# Patient Record
Sex: FEMALE | Race: WHITE | Employment: OTHER | URBAN - METROPOLITAN AREA
[De-identification: names, ages, dates, MRNs, and addresses within clinical notes are randomized per-mention and may not be internally consistent; named-entity substitution may affect disease eponyms.]

---

## 2018-08-10 PROBLEM — Z98.61 S/P PTCA (PERCUTANEOUS TRANSLUMINAL CORONARY ANGIOPLASTY): Status: ACTIVE | Noted: 2018-08-10

## 2018-08-10 PROBLEM — R07.9 CHEST PAIN: Status: ACTIVE | Noted: 2018-08-10

## 2018-08-10 PROBLEM — E11.9 TYPE 2 DIABETES MELLITUS WITHOUT COMPLICATION, WITHOUT LONG-TERM CURRENT USE OF INSULIN (HCC): Status: ACTIVE | Noted: 2018-08-10

## 2018-08-10 PROBLEM — E78.2 MIXED HYPERLIPIDEMIA: Status: ACTIVE | Noted: 2018-08-10

## 2018-08-14 ENCOUNTER — HOSPITAL ENCOUNTER (OUTPATIENT)
Dept: CARDIAC CATH/INVASIVE PROCEDURES | Age: 73
Discharge: HOME | End: 2018-08-14
Attending: INTERNAL MEDICINE | Admitting: INTERNAL MEDICINE
Payer: MEDICARE

## 2018-08-14 VITALS
HEIGHT: 63 IN | BODY MASS INDEX: 23.92 KG/M2 | WEIGHT: 135 LBS | OXYGEN SATURATION: 94 % | RESPIRATION RATE: 18 BRPM | TEMPERATURE: 98.1 F | HEART RATE: 63 BPM | SYSTOLIC BLOOD PRESSURE: 137 MMHG | DIASTOLIC BLOOD PRESSURE: 63 MMHG

## 2018-08-14 LAB
ALBUMIN SERPL-MCNC: 3.9 G/DL (ref 3.2–4.6)
ALBUMIN/GLOB SERPL: 1.1 {RATIO} (ref 1.2–3.5)
ALP SERPL-CCNC: 77 U/L (ref 50–136)
ALT SERPL-CCNC: 22 U/L (ref 12–65)
ANION GAP SERPL CALC-SCNC: 9 MMOL/L (ref 7–16)
AST SERPL-CCNC: 24 U/L (ref 15–37)
BILIRUB SERPL-MCNC: 0.7 MG/DL (ref 0.2–1.1)
BUN SERPL-MCNC: 14 MG/DL (ref 8–23)
CALCIUM SERPL-MCNC: 9.1 MG/DL (ref 8.3–10.4)
CHLORIDE SERPL-SCNC: 102 MMOL/L (ref 98–107)
CO2 SERPL-SCNC: 30 MMOL/L (ref 21–32)
CREAT SERPL-MCNC: 0.88 MG/DL (ref 0.6–1)
ERYTHROCYTE [DISTWIDTH] IN BLOOD BY AUTOMATED COUNT: 13.2 %
GLOBULIN SER CALC-MCNC: 3.7 G/DL (ref 2.3–3.5)
GLUCOSE SERPL-MCNC: 119 MG/DL (ref 65–100)
HCT VFR BLD AUTO: 38.3 % (ref 35.8–46.3)
HGB BLD-MCNC: 13.1 G/DL (ref 11.7–15.4)
INR PPP: 1
MCH RBC QN AUTO: 30.9 PG (ref 26.1–32.9)
MCHC RBC AUTO-ENTMCNC: 34.2 G/DL (ref 31.4–35)
MCV RBC AUTO: 90.3 FL (ref 79.6–97.8)
NRBC # BLD: 0 K/UL (ref 0–0.2)
PLATELET # BLD AUTO: 286 K/UL (ref 150–450)
PMV BLD AUTO: 9.8 FL (ref 9.4–12.3)
POTASSIUM SERPL-SCNC: 3.4 MMOL/L (ref 3.5–5.1)
PROT SERPL-MCNC: 7.6 G/DL (ref 6.3–8.2)
PROTHROMBIN TIME: 13.2 SEC (ref 11.5–14.5)
RBC # BLD AUTO: 4.24 M/UL (ref 4.05–5.2)
SODIUM SERPL-SCNC: 141 MMOL/L (ref 136–145)
WBC # BLD AUTO: 8.2 K/UL (ref 4.3–11.1)

## 2018-08-14 PROCEDURE — 77030004559 HC CATH ANGI DX SUPT CARD -B

## 2018-08-14 PROCEDURE — 74011250636 HC RX REV CODE- 250/636: Performed by: INTERNAL MEDICINE

## 2018-08-14 PROCEDURE — 74011250637 HC RX REV CODE- 250/637: Performed by: INTERNAL MEDICINE

## 2018-08-14 PROCEDURE — 85027 COMPLETE CBC AUTOMATED: CPT

## 2018-08-14 PROCEDURE — C1769 GUIDE WIRE: HCPCS

## 2018-08-14 PROCEDURE — 77030019569 HC BND COMPR RAD TERU -B

## 2018-08-14 PROCEDURE — 74011250636 HC RX REV CODE- 250/636

## 2018-08-14 PROCEDURE — 80053 COMPREHEN METABOLIC PANEL: CPT

## 2018-08-14 PROCEDURE — 74011636320 HC RX REV CODE- 636/320: Performed by: INTERNAL MEDICINE

## 2018-08-14 PROCEDURE — 99153 MOD SED SAME PHYS/QHP EA: CPT

## 2018-08-14 PROCEDURE — 93306 TTE W/DOPPLER COMPLETE: CPT

## 2018-08-14 PROCEDURE — 74011000250 HC RX REV CODE- 250: Performed by: INTERNAL MEDICINE

## 2018-08-14 PROCEDURE — 77030004534 HC CATH ANGI DX INFN CARD -A

## 2018-08-14 PROCEDURE — 77030015766

## 2018-08-14 PROCEDURE — C1894 INTRO/SHEATH, NON-LASER: HCPCS

## 2018-08-14 PROCEDURE — 93458 L HRT ARTERY/VENTRICLE ANGIO: CPT

## 2018-08-14 PROCEDURE — 85610 PROTHROMBIN TIME: CPT

## 2018-08-14 PROCEDURE — 99152 MOD SED SAME PHYS/QHP 5/>YRS: CPT

## 2018-08-14 RX ORDER — LIDOCAINE HYDROCHLORIDE 10 MG/ML
1-5 INJECTION INFILTRATION; PERINEURAL ONCE
Status: COMPLETED | OUTPATIENT
Start: 2018-08-14 | End: 2018-08-14

## 2018-08-14 RX ORDER — GUAIFENESIN 100 MG/5ML
81-324 LIQUID (ML) ORAL ONCE
Status: COMPLETED | OUTPATIENT
Start: 2018-08-14 | End: 2018-08-14

## 2018-08-14 RX ORDER — HEPARIN SODIUM 200 [USP'U]/100ML
3 INJECTION, SOLUTION INTRAVENOUS CONTINUOUS
Status: DISCONTINUED | OUTPATIENT
Start: 2018-08-14 | End: 2018-08-14 | Stop reason: HOSPADM

## 2018-08-14 RX ORDER — SODIUM CHLORIDE 0.9 % (FLUSH) 0.9 %
5-10 SYRINGE (ML) INJECTION AS NEEDED
Status: DISCONTINUED | OUTPATIENT
Start: 2018-08-14 | End: 2018-08-14 | Stop reason: HOSPADM

## 2018-08-14 RX ORDER — SODIUM CHLORIDE 9 MG/ML
75 INJECTION, SOLUTION INTRAVENOUS CONTINUOUS
Status: DISCONTINUED | OUTPATIENT
Start: 2018-08-14 | End: 2018-08-14 | Stop reason: HOSPADM

## 2018-08-14 RX ORDER — DIAZEPAM 5 MG/1
5 TABLET ORAL ONCE
Status: COMPLETED | OUTPATIENT
Start: 2018-08-14 | End: 2018-08-14

## 2018-08-14 RX ORDER — SODIUM CHLORIDE 0.9 % (FLUSH) 0.9 %
5-10 SYRINGE (ML) INJECTION EVERY 8 HOURS
Status: DISCONTINUED | OUTPATIENT
Start: 2018-08-14 | End: 2018-08-14 | Stop reason: HOSPADM

## 2018-08-14 RX ORDER — FENTANYL CITRATE 50 UG/ML
25-50 INJECTION, SOLUTION INTRAMUSCULAR; INTRAVENOUS
Status: DISCONTINUED | OUTPATIENT
Start: 2018-08-14 | End: 2018-08-14 | Stop reason: HOSPADM

## 2018-08-14 RX ORDER — MIDAZOLAM HYDROCHLORIDE 1 MG/ML
.5-2 INJECTION, SOLUTION INTRAMUSCULAR; INTRAVENOUS
Status: DISCONTINUED | OUTPATIENT
Start: 2018-08-14 | End: 2018-08-14 | Stop reason: HOSPADM

## 2018-08-14 RX ADMIN — HEPARIN SODIUM 3 ML/HR: 5000 INJECTION, SOLUTION INTRAVENOUS; SUBCUTANEOUS at 10:41

## 2018-08-14 RX ADMIN — DIAZEPAM 5 MG: 5 TABLET ORAL at 09:24

## 2018-08-14 RX ADMIN — HEPARIN SODIUM 2 ML: 10000 INJECTION INTRAVENOUS; SUBCUTANEOUS at 10:50

## 2018-08-14 RX ADMIN — SODIUM CHLORIDE 75 ML/HR: 900 INJECTION, SOLUTION INTRAVENOUS at 09:24

## 2018-08-14 RX ADMIN — MIDAZOLAM HYDROCHLORIDE 1 MG: 1 INJECTION, SOLUTION INTRAMUSCULAR; INTRAVENOUS at 10:46

## 2018-08-14 RX ADMIN — IOPAMIDOL 80 ML: 755 INJECTION, SOLUTION INTRAVENOUS at 11:10

## 2018-08-14 RX ADMIN — LIDOCAINE HYDROCHLORIDE 3 ML: 10 INJECTION, SOLUTION INFILTRATION; PERINEURAL at 10:50

## 2018-08-14 RX ADMIN — ASPIRIN 81 MG 324 MG: 81 TABLET ORAL at 09:24

## 2018-08-14 RX ADMIN — LIDOCAINE HYDROCHLORIDE 5 ML: 10 INJECTION, SOLUTION INFILTRATION; PERINEURAL at 10:57

## 2018-08-14 RX ADMIN — FENTANYL CITRATE 25 MCG: 50 INJECTION, SOLUTION INTRAMUSCULAR; INTRAVENOUS at 10:46

## 2018-08-14 NOTE — CONSULTS
Trinh Garza. MD Go Stevenson. MD Edwin Rendon         8/14/2018 1945    REFERRING PHYSICIAN:  Dr. Jeannine Tillman:  The patient is a 71 y.o. female who was seen in consultation by Dr. Belle Olmos for progressive chest pain. She has known CAD with prior PCI. Last [facility] was in [place], [place] in 8/17 that showed moderate stenosis. She underwent cardiac catheterization today that showed severe multivessel disease. She has history of dementia, DM, hypothyroidism and HTN. Past Medical History:   Diagnosis Date    CAD (coronary artery disease)     Diabetes ([address]     GERD (gastroesophageal reflux disease)     Thyroid disease        Past Surgical History:   Procedure Laterality Date    HX HYSTERECTOMY         History reviewed. No pertinent family history. Social History     Social History    Marital status:      Spouse name: N/A    Number of children: N/A    Years of education: N/A     Occupational History    Not on file. Social History Main Topics    Smoking status: Never Smoker    Smokeless tobacco: Never Used    Alcohol use Yes      Comment: occasional    Drug use: No    Sexual activity: Not on file     Other Topics Concern    Not on file     Social History Narrative       No Known Allergies    No current facility-administered medications on file prior to encounter. Current Outpatient Prescriptions on File Prior to Encounter   Medication Sig Dispense Refill    isosorbide mononitrate ER (IMDUR) 30 mg tablet TK 1 T PO QD IN THE MORNING      levothyroxine (SYNTHROID) 88 mcg tablet TAKE 1 TABLET BY MOUTH EVERY DAY      aspirin delayed-release 81 mg tablet Take 81 mg by mouth.  memantine (NAMENDA) 10 mg tablet TK 1 T PO BID      metFORMIN ER (GLUCOPHAGE XR) 750 mg tablet Take 750 mg by mouth.       metoprolol succinate (TOPROL-XL) 25 mg XL tablet TAKE 1 TABLET BY MOUTH EVERY DAY      pantoprazole (PROTONIX) 40 mg tablet TAKE 1 TABLET BY MOUTH EVERY DAY      pravastatin (PRAVACHOL) 40 mg tablet Take 40 mg by mouth.  venlafaxine-SR (EFFEXOR-XR) 75 mg capsule TAKE 1 CAPSULE BY MOUTH EVERY DAY      estradiol (ESTRACE) 0.5 mg tablet TAKE 1 TABLET BY MOUTH EVERY DAY      potassium chloride SR (KLOR-CON 10) 10 mEq tablet Take 10 mEq by mouth.  nitroglycerin (NITROSTAT) 0.4 mg SL tablet 1 Tab by SubLINGual route every five (5) minutes as needed for Chest Pain. Up to 3 doses. 30 Tab 5       REVIEW OF SYSTEMS:  Review of Systems   Constitution: Negative for chills, fever, weakness, malaise/fatigue, weight gain and weight loss. HENT: Negative for ear pain, hearing loss, nosebleeds, sore throat and tinnitus. Eyes: Negative for blurred vision, vision loss in left eye and vision loss in right eye. Cardiovascular: Positive for chest pain. Negative for dyspnea on exertion, leg swelling, near-syncope, orthopnea, palpitations, paroxysmal nocturnal dyspnea and syncope. Respiratory: Negative for cough, hemoptysis, shortness of breath, sputum production and wheezing. Endocrine: Negative for cold intolerance, heat intolerance and polydipsia. Hematologic/Lymphatic: Does not bruise/bleed easily. Skin: Negative for color change and rash. Musculoskeletal: Negative for back pain, joint pain, joint swelling and myalgias. Gastrointestinal: Negative for abdominal pain, constipation, diarrhea, dysphagia, heartburn, hematemesis, melena, nausea and vomiting. Genitourinary: Negative for dysuria, frequency, hematuria and urgency. Neurological: Negative for difficulty with concentration, dizziness, headaches, light-headedness, numbness, paresthesias, seizures and vertigo. Psychiatric/Behavioral: Negative for altered mental status and depression.        Physical Exam  Vitals:    08/14/18 1300 08/14/18 1315 08/14/18 1330 08/14/18 1345   BP: 162/71 157/74 159/70 170/74   Pulse: 61 60 74 62   Resp: 24 16 29 25   Temp:       SpO2: Weight:       Height:           Physical Exam:  General: Well Developed, Well Nourished, No Acute Distress  HEENT: Normocephalic, pupils equal and round, no scleral icterus  Neck: supple, no JVD  Chest wall: No deformity  Heart: S1S2 with RRR without murmurs or gallops  Lungs: Clear throughout auscultation bilaterally without adventitious sounds  Vascular: There is no venous stasis disease. Abd: soft, nontender, nondistended, with good bowel sounds, no pulsatile masses  Ext: warm, no edema, calves supple/nontender, pulses 2+ bilaterally  Skin: warm and dry, no rashes, cyanosis, jaundice, ecchymoses or evidence of skin breakdown  Psychiatric: Normal mood and affect  Neurologic: Alert, appears confused on exam, poor short term memory    Labs:   Recent Labs      08/14/18   0923   NA  141   K  3.4*   BUN  14   CREA  0.88   GLU  119*   WBC  8.2   HGB  13.1   HCT  38.3   PLT  286   INR  1.0         Assessment:     Active Problems:    * CAD  HTN  Dementia  Hypothyroidism  DM      Plan:       Dr. Smith Coates has personally viewed the cardiac catheterization. Echocardiogram is pending. Plan discussed with Dr. Terence Bronson, will attempt medical therapy given advanced dementia. Will consider proceeding with CABG surgery if refractory angina.

## 2018-08-14 NOTE — IP AVS SNAPSHOT
[address] [facility] 
 
 
 [address] 
[phone] Patient: Kaylene Cedeno MRN: [id number] GRM:[birth year] Discharge Summary 8/14/2018 Kaylene Cedeno MRN[de-identified]  [id number] Admission Information Provider Pager Service Admission Date Expected D/C Date Jozef Herrera MD  CARDIAC CATH LAB 8/14/2018 Actual LOS Patient Class 0 days OUTPATIENT Follow-up Information Follow up With Details Comments Contact Info Brit Cortés MD  A follow up appointment has been made for you on Monday, August 20 at 1:30 in the [place] office. [address] [place] [address] 
[phone] My Medications ASK your physician about these medications Instructions Each Dose to Equal  
 Morning Noon Evening Bedtime  
 aspirin delayed-release 81 mg tablet Your last dose was: Your next dose is: Take 81 mg by mouth. 81 mg  
    
   
   
   
  
 estradiol 0.5 mg tablet Commonly known as:  ESTRACE Your last dose was: Your next dose is: TAKE 1 TABLET BY MOUTH EVERY DAY  
     
   
   
   
  
 isosorbide mononitrate ER 30 mg tablet Commonly known as:  IMDUR Your last dose was: Your next dose is:    
   
   
 TK 1 T PO QD IN THE MORNING  
     
   
   
   
  
 levothyroxine 88 mcg tablet Commonly known as:  SYNTHROID Your last dose was: Your next dose is: TAKE 1 TABLET BY MOUTH EVERY DAY  
     
   
   
   
  
 memantine 10 mg tablet Commonly known as:  Norlin Kingstonler Your last dose was: Your next dose is:    
   
   
 TK 1 T PO BID  
     
   
   
   
  
 metFORMIN  mg tablet Commonly known as:  GLUCOPHAGE XR Your last dose was: Your next dose is: Take 750 mg by mouth. 750 mg  
    
   
   
   
  
 metoprolol succinate 25 mg XL tablet Commonly known as:  TOPROL-XL Your last dose was: Your next dose is: TAKE 1 TABLET BY MOUTH EVERY DAY  
     
   
   
   
  
 nitroglycerin 0.4 mg SL tablet Commonly known as:  NITROSTAT Your last dose was: Your next dose is:    
   
   
 1 Tab by SubLINGual route every five (5) minutes as needed for Chest Pain. Up to 3 doses. 0.4 mg  
    
   
   
   
  
 pantoprazole 40 mg tablet Commonly known as:  PROTONIX Your last dose was: Your next dose is: TAKE 1 TABLET BY MOUTH EVERY DAY  
     
   
   
   
  
 potassium chloride SR 10 mEq tablet Commonly known as:  KLOR-CON 10 Your last dose was: Your next dose is: Take 10 mEq by mouth. 10 mEq  
    
   
   
   
  
 pravastatin 40 mg tablet Commonly known as:  PRAVACHOL Your last dose was: Your next dose is: Take 40 mg by mouth. 40 mg  
    
   
   
   
  
 venlafaxine-SR 75 mg capsule Commonly known as:  EFFEXOR-XR Your last dose was: Your next dose is: TAKE 1 CAPSULE BY MOUTH EVERY DAY General Information Please provide this summary of care documentation to your next provider. Allergies No Known Allergies Current Immunizations  Never Reviewed No immunizations on file. Discharge Instructions Discharge Instructions HEART CATHETERIZATION/ANGIOGRAPHY DISCHARGE INSTRUCTIONS 1. Check puncture site frequently for swelling or bleeding. If there is any bleeding, lie down and apply pressure over the area with a clean towel or washcloth and call 911. Notify your doctor for any redness, swelling, drainage, or oozing from the puncture site. Notify your doctor for any fever or chills. 2. If the extremity becomes cold, numb, or painful call [facility] Cardiology at [phone]. 3. Activity should be limited for the next 48 hours. Climb stairs as little as possible and avoid any stooping, bending, or strenuous activity for 48 hours. No heavy lifting (anything over 10 pounds) for 3 days. 4. You may resume your usual diet. Drink more fluids than usual. 
5. Have a responsible person drive you home and stay with you for at least 24 hours after your heart catheterization/angiography. No driving for 24 hours. 6. You may remove bandage from your ARM/GROIN in 24 hours. You may shower in 24 hours. No tub baths, hot tubs, or swimming for 1 week. Do not place any lotions, creams, powders, or ointments over puncture site for 1 week. You may place a clean band-aid over the puncture site each day for 5 days. Change daily. 7. No Metformin until Thursday. Continue all other medications as prescribed. I have read the above instructions and have had the opportunity to ask questions. Patient: ________________________   Date: 8/14/2018 Witness: _______________________   Date: 8/14/2018 Discharge Orders None  
  
` Patient Signature:  ____________________________________________________________ Date:  ____________________________________________________________  
  
 Loyda Das Provider Signature:  ____________________________________________________________ Date:  ____________________________________________________________

## 2018-08-14 NOTE — PROGRESS NOTES
Pt arrived, ambulated to room with no visible problems, planned C for Dr Gracia Lopez. Consent signed, Procedure discussed with pt all questions answered voiced understanding. Medications and history discussed with pt.     Pt prepped per ordersThe patient has a fraility score of 4-VULNERABLE, based on ability to complete ADLs without assistance, pt has some mild dementia      Patient took Aspirin 324mg  today at 0924 prior to arrival.

## 2018-08-14 NOTE — PROGRESS NOTES
TRANSFER - IN REPORT:    Verbal report received from [facility] on Lakisha Wyatt  being received from Cath Lab for routine progression of care      Report consisted of patients Situation, Background, Assessment and   Recommendations(SBAR). Information from the following report(s) SBAR and Kardex was reviewed with the receiving nurse. Opportunity for questions and clarification was provided. Assessment completed upon patients arrival to unit and care assumed.

## 2018-08-14 NOTE — PROGRESS NOTES
1550-patient ambulated to bath room with no difficulties. Right groin/right radial with no bleeding or hematoma. R band removed and sterile dressing applied to site    1555- all discharge instructions explained to patient and family. Time allowed for questions no. No questions and concerns at this time. 1600- right groin/right radial checked. Site with no redness or bleeding. pt discharged to home via Wheelchair with family.

## 2018-08-14 NOTE — PROCEDURES
[address] [place] [address].  MR#: [id number]  : 1945  ACCOUNT #: [de-identified]   DATE OF SERVICE: 2018    CLINICAL INFORMATION:  A 73-year-old female with worsening chest pain subacutely and now with rest pain consistent for [place] class 4 angina, referred for catheterization. PROCEDURE DETAILS:  After informed consent was obtained, a 6-Beninese sheath was placed in the right radial artery. A cocktail was given by protocol. A catheter could not be passed up the forearm due to spasm and small size of the vessel. TR band was placed. The catheter was removed. A 6-Beninese sheath was placed in the right femoral artery. A 6-Beninese JL4 Bradley right angle pigtail were used for diagnostic angiography. Manual pressure will be used to gain hemostasis at the catheterization site. Conscious sedation was given by Ginger Lipscomb under my direct supervision. We began at 10:46 and completing at 11:10. A total of 1 mg Versed, 25 mcg fentanyl were given. Vital signs as such were stable throughout. FINDINGS:  Left ventriculogram reveals a normal left ventricular systolic function, ejection fraction 60%. Left ventricular end diastolic pressure is 17 mmHg. Opening aortic pressure 134/68 with no gradient across the aortic valve. Left main coronary artery is in a normal anatomic position free of significant disease, bifurcates to LAD and circumflex system. The LAD is stented in the mid portion. There is in-stent restenosis up to 70%. There is stenosis proximally and distal to the stent that is significant as well. The runoff vessel is small, but free of any high-grade narrowing. The circumflex has tandem calcified lesions, as this vessel terminates into a moderate sized bifurcating obtuse marginal branch. There is 70% followed by 80% tandem narrowings. The right coronary artery is a dominant vessel, normal anatomic position.   There is eccentric midvessel narrowing in the RCA of 80%. The PDA is a small vessel, but has an 85-90% ostial narrowing. The runoff posterior obtuse marginal branch is small, but free of significant disease. CONCLUSION:  Severe native 3-vessel coronary artery disease with preserved left ventricular systolic function. RECOMMENDATIONS:  The patient will be considered for bypass surgery.       Ciara Ruiz MD       [facility] / Brenda Villeda  D: 08/14/2018 11:24     T: 08/14/2018 17:17  JOB #: [id number]

## 2018-08-14 NOTE — PROCEDURES
Brief Cardiac Procedure Note    Patient: Pavan Castorena MRN: [id number]  SSN: [id number]    YOB: 1945  Age: 71 y.o. Sex: female      Date of Procedure: 8/14/2018     Pre-procedure Diagnosis: Typical Angina    Post-procedure Diagnosis: Coronary Artery Disease    Reason for Procedure: New Onset Angina < or = 2 Months    Procedure: Left Heart Catheterization    Brief Description of Procedure: lhc via right fem, raial too small to advance catheter    Performed By: Nguyễn Egan MD     Assistants: none    Anesthesia: Moderate Sedation    Estimated Blood Loss: Less than 10 mL      Specimens: None    Implants: None    Findings: 3 vessel cad- nl lvef    Complications: None    Recommendations: Continue medical therapy.     Signed By: Nguyễn Egan MD     August 14, 2018            \

## 2018-08-14 NOTE — DISCHARGE INSTRUCTIONS
HEART CATHETERIZATION/ANGIOGRAPHY DISCHARGE INSTRUCTIONS    1. Check puncture site frequently for swelling or bleeding. If there is any bleeding, lie down and apply pressure over the area with a clean towel or washcloth and call 911. Notify your doctor for any redness, swelling, drainage, or oozing from the puncture site. Notify your doctor for any fever or chills. 2. If the extremity becomes cold, numb, or painful call [facility] Cardiology at [phone].  3. Activity should be limited for the next 48 hours. Climb stairs as little as possible and avoid any stooping, bending, or strenuous activity for 48 hours. No heavy lifting (anything over 10 pounds) for 3 days. 4. You may resume your usual diet. Drink more fluids than usual.  5. Have a responsible person drive you home and stay with you for at least 24 hours after your heart catheterization/angiography. No driving for 24 hours. 6. You may remove bandage from your ARM/GROIN in 24 hours. You may shower in 24 hours. No tub baths, hot tubs, or swimming for 1 week. Do not place any lotions, creams, powders, or ointments over puncture site for 1 week. You may place a clean band-aid over the puncture site each day for 5 days. Change daily. 7. No Metformin until Thursday. Continue all other medications as prescribed. I have read the above instructions and have had the opportunity to ask questions.       Patient: ________________________   Date: 8/14/2018    Witness: _______________________   Date: 8/14/2018

## 2018-08-14 NOTE — PROGRESS NOTES
TRANSFER - OUT REPORT:    R femoral [facility] with Dr Gracia Lopez  Attempted right radial but unable to pass catheter through arm  Versed 1 mg  Fentanyl 25 mcg  TR band 13 ml at 1055  6 fr sheath in right groin covered with a tegaderm  Surgery consult  No bleeding or hematoma noted at site. Site soft    Verbal report given to Esme(name) on Vesna Nicole  being transferred to CPRU(unit) for routine progression of care       Report consisted of patients Situation, Background, Assessment and   Recommendations(SBAR). Information from the following report(s) Procedure Summary was reviewed with the receiving nurse. Lines:   Peripheral IV 08/14/18 Left Antecubital (Active)        Opportunity for questions and clarification was provided.       Patient transported with:   Registered Nurse

## 2018-08-20 PROBLEM — R41.3 MEMORY LOSS: Status: ACTIVE | Noted: 2018-08-20

## 2018-08-20 PROBLEM — I10 ESSENTIAL HYPERTENSION WITH GOAL BLOOD PRESSURE LESS THAN 130/85: Status: ACTIVE | Noted: 2018-08-20

## 2018-09-25 PROBLEM — F03.90 DEMENTIA WITHOUT BEHAVIORAL DISTURBANCE (HCC): Status: ACTIVE | Noted: 2018-09-25

## 2018-09-25 PROBLEM — I25.10 CORONARY ARTERY DISEASE INVOLVING NATIVE CORONARY ARTERY OF NATIVE HEART: Status: ACTIVE | Noted: 2018-09-25

## 2018-09-25 PROBLEM — R07.89 CHEST DISCOMFORT: Status: ACTIVE | Noted: 2018-09-25

## 2018-10-01 PROBLEM — Z01.818 PREOP TESTING: Status: ACTIVE | Noted: 2018-10-01

## 2018-10-02 ENCOUNTER — HOSPITAL ENCOUNTER (OUTPATIENT)
Dept: CARDIAC CATH/INVASIVE PROCEDURES | Age: 73
Setting detail: OBSERVATION
Discharge: HOME | End: 2018-10-03
Attending: INTERNAL MEDICINE | Admitting: INTERNAL MEDICINE
Payer: MEDICARE

## 2018-10-02 PROBLEM — Z98.61 POST PTCA: Status: ACTIVE | Noted: 2018-10-02

## 2018-10-02 LAB
ANION GAP SERPL CALC-SCNC: 8 MMOL/L (ref 7–16)
ATRIAL RATE: 51 BPM
ATRIAL RATE: 76 BPM
BUN SERPL-MCNC: 17 MG/DL (ref 8–23)
CALCIUM SERPL-MCNC: 8.7 MG/DL (ref 8.3–10.4)
CALCULATED P AXIS, ECG09: 30 DEGREES
CALCULATED P AXIS, ECG09: 56 DEGREES
CALCULATED R AXIS, ECG10: 19 DEGREES
CALCULATED R AXIS, ECG10: 29 DEGREES
CALCULATED T AXIS, ECG11: 40 DEGREES
CALCULATED T AXIS, ECG11: 50 DEGREES
CHLORIDE SERPL-SCNC: 103 MMOL/L (ref 98–107)
CO2 SERPL-SCNC: 29 MMOL/L (ref 21–32)
CREAT SERPL-MCNC: 0.77 MG/DL (ref 0.6–1)
DIAGNOSIS, 93000: NORMAL
DIAGNOSIS, 93000: NORMAL
ERYTHROCYTE [DISTWIDTH] IN BLOOD BY AUTOMATED COUNT: 13.1 %
GLUCOSE BLD STRIP.AUTO-MCNC: 217 MG/DL (ref 65–100)
GLUCOSE SERPL-MCNC: 131 MG/DL (ref 65–100)
HCT VFR BLD AUTO: 36.9 % (ref 35.8–46.3)
HGB BLD-MCNC: 12.6 G/DL (ref 11.7–15.4)
MAGNESIUM SERPL-MCNC: 1.7 MG/DL (ref 1.8–2.4)
MCH RBC QN AUTO: 31.2 PG (ref 26.1–32.9)
MCHC RBC AUTO-ENTMCNC: 34.1 G/DL (ref 31.4–35)
MCV RBC AUTO: 91.3 FL (ref 79.6–97.8)
NRBC # BLD: 0 K/UL (ref 0–0.2)
P-R INTERVAL, ECG05: 140 MS
P-R INTERVAL, ECG05: 152 MS
PLATELET # BLD AUTO: 273 K/UL (ref 150–450)
PMV BLD AUTO: 9.8 FL (ref 9.4–12.3)
POTASSIUM SERPL-SCNC: 3.2 MMOL/L (ref 3.5–5.1)
Q-T INTERVAL, ECG07: 370 MS
Q-T INTERVAL, ECG07: 448 MS
QRS DURATION, ECG06: 68 MS
QRS DURATION, ECG06: 80 MS
QTC CALCULATION (BEZET), ECG08: 412 MS
QTC CALCULATION (BEZET), ECG08: 416 MS
RBC # BLD AUTO: 4.04 M/UL (ref 4.05–5.2)
SODIUM SERPL-SCNC: 140 MMOL/L (ref 136–145)
VENTRICULAR RATE, ECG03: 51 BPM
VENTRICULAR RATE, ECG03: 76 BPM
WBC # BLD AUTO: 5.4 K/UL (ref 4.3–11.1)

## 2018-10-02 PROCEDURE — C1894 INTRO/SHEATH, NON-LASER: HCPCS

## 2018-10-02 PROCEDURE — 99152 MOD SED SAME PHYS/QHP 5/>YRS: CPT

## 2018-10-02 PROCEDURE — C1769 GUIDE WIRE: HCPCS

## 2018-10-02 PROCEDURE — 93005 ELECTROCARDIOGRAM TRACING: CPT | Performed by: INTERNAL MEDICINE

## 2018-10-02 PROCEDURE — 77030020263 HC SOL INJ SOD CL0.9% LFCR 1000ML

## 2018-10-02 PROCEDURE — C1725 CATH, TRANSLUMIN NON-LASER: HCPCS

## 2018-10-02 PROCEDURE — 74011000258 HC RX REV CODE- 258: Performed by: INTERNAL MEDICINE

## 2018-10-02 PROCEDURE — 85027 COMPLETE CBC AUTOMATED: CPT

## 2018-10-02 PROCEDURE — 74011000250 HC RX REV CODE- 250: Performed by: INTERNAL MEDICINE

## 2018-10-02 PROCEDURE — 82962 GLUCOSE BLOOD TEST: CPT

## 2018-10-02 PROCEDURE — C1887 CATHETER, GUIDING: HCPCS

## 2018-10-02 PROCEDURE — 93454 CORONARY ARTERY ANGIO S&I: CPT

## 2018-10-02 PROCEDURE — 85347 COAGULATION TIME ACTIVATED: CPT

## 2018-10-02 PROCEDURE — 74011250636 HC RX REV CODE- 250/636

## 2018-10-02 PROCEDURE — 99218 HC RM OBSERVATION: CPT

## 2018-10-02 PROCEDURE — C1760 CLOSURE DEV, VASC: HCPCS

## 2018-10-02 PROCEDURE — 83735 ASSAY OF MAGNESIUM: CPT

## 2018-10-02 PROCEDURE — 92928 PRQ TCAT PLMT NTRAC ST 1 LES: CPT

## 2018-10-02 PROCEDURE — 74011250636 HC RX REV CODE- 250/636: Performed by: INTERNAL MEDICINE

## 2018-10-02 PROCEDURE — 74011250637 HC RX REV CODE- 250/637: Performed by: INTERNAL MEDICINE

## 2018-10-02 PROCEDURE — 93571 IV DOP VEL&/PRESS C FLO 1ST: CPT

## 2018-10-02 PROCEDURE — C1874 STENT, COATED/COV W/DEL SYS: HCPCS

## 2018-10-02 PROCEDURE — 80048 BASIC METABOLIC PNL TOTAL CA: CPT

## 2018-10-02 PROCEDURE — 76937 US GUIDE VASCULAR ACCESS: CPT

## 2018-10-02 PROCEDURE — 99153 MOD SED SAME PHYS/QHP EA: CPT

## 2018-10-02 PROCEDURE — 74011636320 HC RX REV CODE- 636/320: Performed by: INTERNAL MEDICINE

## 2018-10-02 RX ORDER — NITROGLYCERIN 0.4 MG/1
0.4 TABLET SUBLINGUAL
Status: DISCONTINUED | OUTPATIENT
Start: 2018-10-02 | End: 2018-10-03 | Stop reason: HOSPADM

## 2018-10-02 RX ORDER — DONEPEZIL HYDROCHLORIDE 5 MG/1
5 TABLET, FILM COATED ORAL
Status: DISCONTINUED | OUTPATIENT
Start: 2018-10-02 | End: 2018-10-03 | Stop reason: HOSPADM

## 2018-10-02 RX ORDER — GUAIFENESIN 100 MG/5ML
324 LIQUID (ML) ORAL ONCE
Status: DISCONTINUED | OUTPATIENT
Start: 2018-10-02 | End: 2018-10-02

## 2018-10-02 RX ORDER — FENTANYL CITRATE 50 UG/ML
25-50 INJECTION, SOLUTION INTRAMUSCULAR; INTRAVENOUS
Status: DISCONTINUED | OUTPATIENT
Start: 2018-10-02 | End: 2018-10-02 | Stop reason: HOSPADM

## 2018-10-02 RX ORDER — SODIUM CHLORIDE 0.9 % (FLUSH) 0.9 %
5-10 SYRINGE (ML) INJECTION AS NEEDED
Status: DISCONTINUED | OUTPATIENT
Start: 2018-10-02 | End: 2018-10-03 | Stop reason: HOSPADM

## 2018-10-02 RX ORDER — CLOPIDOGREL BISULFATE 75 MG/1
75 TABLET ORAL DAILY
Status: DISCONTINUED | OUTPATIENT
Start: 2018-10-03 | End: 2018-10-03 | Stop reason: HOSPADM

## 2018-10-02 RX ORDER — CLOPIDOGREL BISULFATE 75 MG/1
525 TABLET ORAL ONCE
Status: COMPLETED | OUTPATIENT
Start: 2018-10-02 | End: 2018-10-02

## 2018-10-02 RX ORDER — POTASSIUM CHLORIDE 20 MEQ/1
40 TABLET, EXTENDED RELEASE ORAL
Status: COMPLETED | OUTPATIENT
Start: 2018-10-02 | End: 2018-10-02

## 2018-10-02 RX ORDER — MEMANTINE HYDROCHLORIDE 5 MG/1
10 TABLET ORAL DAILY
Status: DISCONTINUED | OUTPATIENT
Start: 2018-10-03 | End: 2018-10-03 | Stop reason: HOSPADM

## 2018-10-02 RX ORDER — METOPROLOL SUCCINATE 25 MG/1
25 TABLET, EXTENDED RELEASE ORAL DAILY
Status: DISCONTINUED | OUTPATIENT
Start: 2018-10-03 | End: 2018-10-03 | Stop reason: HOSPADM

## 2018-10-02 RX ORDER — MORPHINE SULFATE 2 MG/ML
2 INJECTION, SOLUTION INTRAMUSCULAR; INTRAVENOUS
Status: DISCONTINUED | OUTPATIENT
Start: 2018-10-02 | End: 2018-10-03 | Stop reason: HOSPADM

## 2018-10-02 RX ORDER — HEPARIN SODIUM 200 [USP'U]/100ML
3 INJECTION, SOLUTION INTRAVENOUS CONTINUOUS
Status: DISCONTINUED | OUTPATIENT
Start: 2018-10-02 | End: 2018-10-02 | Stop reason: HOSPADM

## 2018-10-02 RX ORDER — GUAIFENESIN 100 MG/5ML
81 LIQUID (ML) ORAL DAILY
Status: DISCONTINUED | OUTPATIENT
Start: 2018-10-03 | End: 2018-10-03 | Stop reason: HOSPADM

## 2018-10-02 RX ORDER — ACETAMINOPHEN 325 MG/1
650 TABLET ORAL
Status: DISCONTINUED | OUTPATIENT
Start: 2018-10-02 | End: 2018-10-03 | Stop reason: HOSPADM

## 2018-10-02 RX ORDER — HYDROCODONE BITARTRATE AND ACETAMINOPHEN 5; 325 MG/1; MG/1
1 TABLET ORAL
Status: DISCONTINUED | OUTPATIENT
Start: 2018-10-02 | End: 2018-10-03 | Stop reason: HOSPADM

## 2018-10-02 RX ORDER — SODIUM CHLORIDE 9 MG/ML
75 INJECTION, SOLUTION INTRAVENOUS CONTINUOUS
Status: DISCONTINUED | OUTPATIENT
Start: 2018-10-02 | End: 2018-10-03 | Stop reason: HOSPADM

## 2018-10-02 RX ORDER — CLOPIDOGREL BISULFATE 75 MG/1
75 TABLET ORAL
Status: COMPLETED | OUTPATIENT
Start: 2018-10-02 | End: 2018-10-02

## 2018-10-02 RX ORDER — RANOLAZINE 500 MG/1
500 TABLET, EXTENDED RELEASE ORAL 2 TIMES DAILY
Status: DISCONTINUED | OUTPATIENT
Start: 2018-10-02 | End: 2018-10-03 | Stop reason: HOSPADM

## 2018-10-02 RX ORDER — SODIUM CHLORIDE 0.9 % (FLUSH) 0.9 %
5-10 SYRINGE (ML) INJECTION EVERY 8 HOURS
Status: DISCONTINUED | OUTPATIENT
Start: 2018-10-02 | End: 2018-10-03 | Stop reason: HOSPADM

## 2018-10-02 RX ORDER — SODIUM CHLORIDE 9 MG/ML
75 INJECTION, SOLUTION INTRAVENOUS CONTINUOUS
Status: DISCONTINUED | OUTPATIENT
Start: 2018-10-02 | End: 2018-10-02

## 2018-10-02 RX ORDER — MAGNESIUM SULFATE HEPTAHYDRATE 40 MG/ML
2 INJECTION, SOLUTION INTRAVENOUS ONCE
Status: COMPLETED | OUTPATIENT
Start: 2018-10-02 | End: 2018-10-03

## 2018-10-02 RX ORDER — PANTOPRAZOLE SODIUM 40 MG/1
40 TABLET, DELAYED RELEASE ORAL
Status: DISCONTINUED | OUTPATIENT
Start: 2018-10-03 | End: 2018-10-03 | Stop reason: HOSPADM

## 2018-10-02 RX ORDER — BIVALIRUDIN 250 MG/5ML
0.75 INJECTION, POWDER, LYOPHILIZED, FOR SOLUTION INTRAVENOUS ONCE
Status: COMPLETED | OUTPATIENT
Start: 2018-10-02 | End: 2018-10-02

## 2018-10-02 RX ORDER — POTASSIUM CHLORIDE 750 MG/1
10 TABLET, EXTENDED RELEASE ORAL 2 TIMES DAILY
Status: DISCONTINUED | OUTPATIENT
Start: 2018-10-02 | End: 2018-10-03 | Stop reason: HOSPADM

## 2018-10-02 RX ORDER — MIDAZOLAM HYDROCHLORIDE 1 MG/ML
.5-2 INJECTION, SOLUTION INTRAMUSCULAR; INTRAVENOUS
Status: DISCONTINUED | OUTPATIENT
Start: 2018-10-02 | End: 2018-10-02 | Stop reason: HOSPADM

## 2018-10-02 RX ORDER — LIDOCAINE HYDROCHLORIDE 10 MG/ML
5-10 INJECTION INFILTRATION; PERINEURAL ONCE
Status: COMPLETED | OUTPATIENT
Start: 2018-10-02 | End: 2018-10-02

## 2018-10-02 RX ORDER — POTASSIUM CHLORIDE 14.9 MG/ML
20 INJECTION INTRAVENOUS ONCE
Status: COMPLETED | OUTPATIENT
Start: 2018-10-02 | End: 2018-10-03

## 2018-10-02 RX ORDER — PRAVASTATIN SODIUM 20 MG/1
40 TABLET ORAL
Status: DISCONTINUED | OUTPATIENT
Start: 2018-10-02 | End: 2018-10-03 | Stop reason: HOSPADM

## 2018-10-02 RX ORDER — LEVOTHYROXINE SODIUM 88 UG/1
88 TABLET ORAL
Status: DISCONTINUED | OUTPATIENT
Start: 2018-10-03 | End: 2018-10-03 | Stop reason: HOSPADM

## 2018-10-02 RX ORDER — VENLAFAXINE HYDROCHLORIDE 75 MG/1
75 CAPSULE, EXTENDED RELEASE ORAL
Status: DISCONTINUED | OUTPATIENT
Start: 2018-10-03 | End: 2018-10-03 | Stop reason: HOSPADM

## 2018-10-02 RX ORDER — ISOSORBIDE MONONITRATE 30 MG/1
30 TABLET, EXTENDED RELEASE ORAL DAILY
Status: DISCONTINUED | OUTPATIENT
Start: 2018-10-03 | End: 2018-10-03 | Stop reason: HOSPADM

## 2018-10-02 RX ADMIN — SODIUM CHLORIDE 75 ML/HR: 900 INJECTION, SOLUTION INTRAVENOUS at 10:13

## 2018-10-02 RX ADMIN — PRAVASTATIN SODIUM 40 MG: 20 TABLET ORAL at 22:07

## 2018-10-02 RX ADMIN — CLOPIDOGREL BISULFATE 525 MG: 75 TABLET ORAL at 13:47

## 2018-10-02 RX ADMIN — LIDOCAINE HYDROCHLORIDE 6 ML: 10 INJECTION, SOLUTION INFILTRATION; PERINEURAL at 13:47

## 2018-10-02 RX ADMIN — MIDAZOLAM HYDROCHLORIDE 1 MG: 1 INJECTION, SOLUTION INTRAMUSCULAR; INTRAVENOUS at 13:43

## 2018-10-02 RX ADMIN — Medication 5 ML: at 17:49

## 2018-10-02 RX ADMIN — FENTANYL CITRATE 25 MCG: 50 INJECTION, SOLUTION INTRAMUSCULAR; INTRAVENOUS at 14:19

## 2018-10-02 RX ADMIN — HEPARIN SODIUM 3 ML/HR: 5000 INJECTION, SOLUTION INTRAVENOUS; SUBCUTANEOUS at 13:45

## 2018-10-02 RX ADMIN — POTASSIUM CHLORIDE 20 MEQ: 14.9 INJECTION, SOLUTION INTRAVENOUS at 11:28

## 2018-10-02 RX ADMIN — FENTANYL CITRATE 25 MCG: 50 INJECTION, SOLUTION INTRAMUSCULAR; INTRAVENOUS at 13:43

## 2018-10-02 RX ADMIN — CLOPIDOGREL BISULFATE 75 MG: 75 TABLET ORAL at 10:21

## 2018-10-02 RX ADMIN — BIVALIRUDIN 1.75 MG/KG/HR: 250 INJECTION, POWDER, LYOPHILIZED, FOR SOLUTION INTRAVENOUS at 13:48

## 2018-10-02 RX ADMIN — HEPARIN SODIUM 2 ML: 10000 INJECTION INTRAVENOUS; SUBCUTANEOUS at 13:46

## 2018-10-02 RX ADMIN — POTASSIUM CHLORIDE 40 MEQ: 1500 TABLET, EXTENDED RELEASE ORAL at 11:28

## 2018-10-02 RX ADMIN — MIDAZOLAM HYDROCHLORIDE 1 MG: 1 INJECTION, SOLUTION INTRAMUSCULAR; INTRAVENOUS at 14:19

## 2018-10-02 RX ADMIN — DONEPEZIL HYDROCHLORIDE 5 MG: 5 TABLET, FILM COATED ORAL at 22:07

## 2018-10-02 RX ADMIN — POTASSIUM CHLORIDE 10 MEQ: 10 TABLET, EXTENDED RELEASE ORAL at 17:48

## 2018-10-02 RX ADMIN — SODIUM CHLORIDE 75 ML/HR: 900 INJECTION, SOLUTION INTRAVENOUS at 17:52

## 2018-10-02 RX ADMIN — MAGNESIUM SULFATE IN WATER 2 G: 40 INJECTION, SOLUTION INTRAVENOUS at 11:28

## 2018-10-02 RX ADMIN — Medication 10 ML: at 22:07

## 2018-10-02 RX ADMIN — RANOLAZINE 500 MG: 500 TABLET, FILM COATED, EXTENDED RELEASE ORAL at 17:48

## 2018-10-02 RX ADMIN — CEFAZOLIN 1 G: 1 INJECTION, POWDER, FOR SOLUTION INTRAMUSCULAR; INTRAVENOUS; PARENTERAL at 14:34

## 2018-10-02 RX ADMIN — BIVALIRUDIN 43 MG: 250 INJECTION, POWDER, LYOPHILIZED, FOR SOLUTION INTRAVENOUS at 13:48

## 2018-10-02 RX ADMIN — IOPAMIDOL 205 ML: 755 INJECTION, SOLUTION INTRAVENOUS at 14:49

## 2018-10-02 NOTE — PROGRESS NOTES
Bedside and Verbal shift change report given to caren srinivasan (oncoming nurse) by self (offgoing nurse). Report included the following information SBAR, Kardex and Med Rec Status.

## 2018-10-02 NOTE — PROGRESS NOTES
TRANSFER - IN REPORT: 
 
Verbal report received from leyda cruz Arline Goldman  being received from cath lab(unit) for routine post - op Report consisted of patients Situation, Background, Assessment and  
Recommendations(SBAR). Information from the following report(s) SBAR, Kardex and Recent Results was reviewed with the receiving nurse. Opportunity for questions and clarification was provided. Assessment completed upon patients arrival to unit and care assumed.

## 2018-10-02 NOTE — PROGRESS NOTES
Patient admitted to observation due to dementia and need to monitor closely post PCI at cath site for bleeding due to anticipated difficulty with following instructions.    
 
Janette Santillan MD

## 2018-10-02 NOTE — PROCEDURES
Brief Cardiac Procedure Note    Patient: Celsa Trujillo MRN: [id number]  SSN: [id number]    YOB: 1945  Age: 68 y.o. Sex: female      Date of Procedure: 10/2/2018     Pre-procedure Diagnosis: Typical Angina    Post-procedure Diagnosis: Coronary Artery Disease    Procedure: PCI    Brief Description of Procedure: As above    Performed By: Juan Carlos Quintanilla MD     Assistants: None    Anesthesia: Moderate Sedation    Estimated Blood Loss: Less than 10 mL      Specimens: None    Implants: None    Findings: ostial RPDA 95 to 0.  Vinayak 2.25 x 22 Post dilated to 2.5 and 3 in dRCA. pcirc 80 to 0. Kenney 2.75 x 26 post dilated to 3. mLAD Vinaayk  2.25 x 30. Post dialted to 2.5. Complications: None. ANgioseal.     Recommendations: Continue medical therapy.     Signed By: Juan Carlos Quintanilla MD     October 2, 2018

## 2018-10-02 NOTE — IP AVS SNAPSHOT
[address] [address] 
[phone] Patient: Celsa Trujillo MRN: [id number] ESF:4/38/0830 About your hospitalization You were admitted on:  October 2, 2018 You last received care in the:  [facility] 3 CLINICAL OBSERVATION You were discharged on:  October 3, 2018 Why you were hospitalized Your primary diagnosis was:  Not on File Your diagnoses also included:  Post Ptca Follow-up Information Follow up With Details Comments Contact Info Domingo Rowley MD Schedule an appointment as soon as possible for a visit As needed [address] [place] [address] 100 [address] [place] [address] 
[phone] Estefany Contreras MD In 2 weeks  [address] [address] 
[phone] [facility]  We will forward your outpatient referral for Cardiac rehab to the [facility] facility as requested. [address] [place] [facility] [address] 
[phone] Estefany Contreras MD  Oct 19 @ 4:00 in [address] [facility] [address] [facility] [address] 
[phone] Your Scheduled Appointments Friday October 19, 2018  4:00 PM EDT HOSPITAL FOLLOW-UP with MD Zhang Barker [facility] ([address] [place] [address]) [address] [place] [address] Rd  
[phone] Monday November 26, 2018  1:15 PM EST Office Visit with MD Zhang Barker [facility] ([address] [place] [address]) [address] [place] [address] Rd  
[phone] Discharge Orders Procedure Order Date Status Priority Quantity Spec Type Associated Dx REFERRAL TO CARDIAC [facility] 10/03/18 0738 Normal Routine 1 A check betty indicates which time of day the medication should be taken. My Medications CHANGE how you take these medications Instructions Each Dose to Equal  
 Morning Noon Evening Bedtime nitroglycerin 0.4 mg SL tablet Commonly known as:  NITROSTAT What changed:  Another medication with the same name was removed. Continue taking this medication, and follow the directions you see here. 1 Tab by SubLINGual route every five (5) minutes as needed for Chest Pain. Up to 3 doses. 0.4 mg  
    
   
   
   
  
  
CONTINUE taking these medications Instructions Each Dose to Equal  
 Morning Noon Evening Bedtime  
 aspirin 81 mg chewable tablet Take 1 Tab by mouth daily. 81 mg  
    
  
   
   
   
  
 clopidogrel 75 mg Tab Commonly known as:  PLAVIX Take 1 Tab by mouth daily. 75 mg  
    
  
   
   
   
  
 donepezil 5 mg tablet Commonly known as:  ARICEPT Take  by mouth nightly. estradiol 0.5 mg tablet Commonly known as:  ESTRACE  
   
 TAKE 1 TABLET BY MOUTH EVERY DAY  
     
  
   
   
   
  
 isosorbide mononitrate ER 30 mg tablet Commonly known as:  IMDUR TK 1 T PO QD IN THE MORNING  
     
  
   
   
   
  
 levothyroxine 88 mcg tablet Commonly known as:  SYNTHROID  
   
 TAKE 1 TABLET BY MOUTH EVERY DAY  
     
  
   
   
   
  
 memantine 10 mg tablet Commonly known as:  Sharl Jatinder 10mg BID  
     
  
   
   
  
   
  
 metFORMIN  mg tablet Commonly known as:  GLUCOPHAGE XR Take 750 mg by mouth. 750 mg  
    
  
   
   
   
  
 metoprolol succinate 25 mg XL tablet Commonly known as:  TOPROL-XL  
   
 TAKE 1 TABLET BY MOUTH EVERY DAY  
     
  
   
   
   
  
 pantoprazole 40 mg tablet Commonly known as:  PROTONIX  
   
 TAKE 1 TABLET BY MOUTH EVERY DAY  
     
  
   
   
   
  
 potassium chloride SA 10 mEq capsule Commonly known as:  Royal Danger Take 10 mEq by mouth two (2) times a day. 10 mEq  
    
  
   
   
  
   
  
 pravastatin 40 mg tablet Commonly known as:  PRAVACHOL Take 40 mg by mouth. 40 mg  
    
   
   
   
  
  
 ranolazine  mg SR tablet Commonly known as:  RANEXA Take 1 Tab by mouth two (2) times a day. 500 mg  
    
  
   
   
  
   
  
 venlafaxine-SR 75 mg capsule Commonly known as:  EFFEXOR-XR  
   
 TAKE 1 CAPSULE BY MOUTH EVERY DAY  
     
  
   
   
   
  
 VITAMIN B-12 1,000 mcg tablet Generic drug:  cyanocobalamin Take 1,000 mcg by mouth daily. 1000 mcg Discharge Instructions Cardiac Catheterization/Angiography Discharge Instructions *Check the puncture site frequently for swelling or bleeding. If you see any bleeding, lie down and apply pressure over the area with a clean town or washcloth. Notify your doctor for any redness, swelling, drainage or oozing from the puncture site. Notify your doctor for any fever or chills. *If the leg or arm with the puncture becomes cold, numb or painful, call [address] Cardiology at [phone] *Activity should be limited for the next 48 hours. Climb stairs as little as possible and avoid any stooping, bending or strenuous activity for 48 hours. No heavy lifting (anything over 10 pounds) for three days. *Do not drive for 48 hours. *You may resume your usual diet. Drink more fluids than usual. 
 
*Have a responsible person drive you home and stay with you for at least 24 hours after your heart catheterization/angiography. *You may remove the bandage from your Right and Groin in 24 hours. You may shower in 24 hours. No tub baths, hot tubs or swimming for one week. Do not place any lotions, creams, powders, ointments over the puncture site for one week. You may place a clean band-aid over the puncture site each day for 5 days. Change this daily. Learning About Percutaneous Coronary Intervention What is percutaneous coronary intervention? Percutaneous coronary intervention (PCI) is the name for procedures to open a blocked coronary artery.  The two most common are coronary angioplasty and coronary stent placement. A PCI is a way to open a blocked coronary artery before, during, or after a heart attack. It gets blood flowing to your heart. And it can help prevent heart problems by widening an artery that has been narrowed by fatty buildup (plaque). This also may be called balloon angioplasty. Before a PCI, a doctor does a test to find blocked arteries. The test is called cardiac catheterization. The doctor puts a tiny tube called a catheter into an artery in your groin or arm. The doctor moves the catheter through the artery to your heart. Then he or she puts a dye into the catheter. This makes your heart's arteries show up on a screen so the doctor can see any blockages. The test also can measure the pressure inside your heart's chambers. If you have a blocked artery, the doctor may do an angioplasty or a coronary stent procedure. In an angioplasty, the doctor uses a catheter with a tiny balloon at the tip. He or she puts it into the blocked area and inflates it. The balloon presses the plaque against the walls of the artery. This makes more room for blood to flow. In most cases, the doctor then puts a stent in the artery. A stent is a small, expandable tube that presses against the walls of the artery. The stent is left in the artery to keep the artery open. This helps blood flow. It also may keep small pieces of plaque from breaking off and causing a heart attack. How is PCI done? PCI is done in a cardiac catheterization laboratory (\"cath lab\"). It is done by a heart specialist called a cardiologist. The whole procedure may take 1½ to 3 hours. You lie on a table under a large X-ray machine. You will get medicine through an IV in one of your veins. It helps you relax and not feel pain. You will be awake during the procedure. But you may not be able to remember much about it.  
The doctor will inject some medicine into your arm or groin to numb the skin. You will feel a small needle stick. It's like having a blood test. You may feel some pressure when the doctor puts in the catheter. But you will not feel pain. The doctor will look at X-ray pictures on a monitor (like a TV set) to move the catheter to your heart. You may feel warm or flushed for a short time when the doctor injects dye into your artery. The doctor then will inflate a tiny balloon at the end of the catheter. The balloon is inflated for a brief time. Then it is deflated and removed. The doctor also may use the catheter to put a stent in the artery. What can you expect after PCI? The catheter will be removed. A nurse or doctor may press on a bandage on the opening. Then a bandage or a compression device may be placed on your groin or wrist at the catheter insertion site. This prevents bleeding. After the test, you will be taken to a room where the catheter site and your heart rate, blood pressure, and temperature will be checked several times. If the catheter was put in your groin, you will need to lie still and keep your leg straight for several hours. If the catheter was put in your arm, you may be able to sit up and get out of bed right away. But you will need to keep your arm still for at least one hour. The average hospital stay is 1 to 2 days for most procedures. When you go home, you will get instructions from your doctor to help you recover well and prevent problems. Make sure to drink plenty of fluids (unless your doctor tells you not to) for several hours after the test. This will help flush the dye out of your body. Your doctor will prescribe blood-thinning medicines. You will likely take aspirin plus another blood thinner. It is very important that you take these medicines exactly as directed. They help keep the coronary artery open and reduce your risk of a heart attack.  
If you have this procedure, you will still need to make lifestyle changes like eating healthy, being active, and not smoking. This will give you the best chance for a longer, healthier life. Follow-up care is a key part of your treatment and safety. Be sure to make and go to all appointments, and call your doctor if you are having problems. It's also a good idea to know your test results and keep a list of the medicines you take. Where can you learn more? Go to http://delma-antonia.info/. Enter V175 in the search box to learn more about \"Learning About Percutaneous Coronary Intervention. \" Current as of: May 10, 2017 Content Version: 11.7 © 8681-1950 [employer]. Care instructions adapted under license by [facility] (which disclaims liability or warranty for this information). If you have questions about a medical condition or this instruction, always ask your healthcare professional. [place] [address] any warranty or liability for your use of this information. DISCHARGE SUMMARY from Nurse PATIENT INSTRUCTIONS: 
 
After general anesthesia or intravenous sedation, for 24 hours or while taking prescription Narcotics: · Limit your activities · Do not drive and operate hazardous machinery · Do not make important personal or business decisions · Do  not drink alcoholic beverages · If you have not urinated within 8 hours after discharge, please contact your surgeon on call. Report the following to your surgeon: 
· Excessive pain, swelling, redness or odor of or around the surgical area · Temperature over 100.5 · Nausea and vomiting lasting longer than 4 hours or if unable to take medications · Any signs of decreased circulation or nerve impairment to extremity: change in color, persistent  numbness, tingling, coldness or increase pain · Any questions What to do at Home: 
Recommended activity: Activity as tolerated and Activity as tolerated and no driving for today The patient is being discharged home in stable condition on a low saturated fat, low cholesterol and low salt diet. The patient is instructed to advance activities as tolerated to the limit of fatigue or shortness of breath. The patient is instructed to avoid all heavy lifting, straining, stooping or squatting for 3-5 days. The patient is instructed to watch the cath site for bleeding/oozing; if seen, the patient is instructed to apply firm pressure with a clean cloth and call [facility] Cardiology at [phone]. The patient is instructed to watch for signs of infection which include: increasing area of redness, fever/hot to touch or purulent drainage at the catheterization site. The patient is instructed not to soak in a bathtub for 7-10 days, but is cleared to shower. The patient is instructed to call the office or return to the ER for immediate evaluation for any shortness of breath or chest pain not relieved by NTG. *  Please give a list of your current medications to your Primary Care Provider. *  Please update this list whenever your medications are discontinued, doses are 
    changed, or new medications (including over-the-counter products) are added. *  Please carry medication information at all times in case of emergency situations. These are general instructions for a healthy lifestyle: No smoking/ No tobacco products/ Avoid exposure to second hand smoke Surgeon General's Warning:  Quitting smoking now greatly reduces serious risk to your health. Obesity, smoking, and sedentary lifestyle greatly increases your risk for illness A healthy diet, regular physical exercise & weight monitoring are important for maintaining a healthy lifestyle You may be retaining fluid if you have a history of heart failure or if you experience any of the following symptoms:  Weight gain of 3 pounds or more overnight or 5 pounds in a week, increased swelling in our hands or feet or shortness of breath while lying flat in bed. Please call your doctor as soon as you notice any of these symptoms; do not wait until your next office visit. Recognize signs and symptoms of STROKE: 
 
F-face looks uneven A-arms unable to move or move unevenly S-speech slurred or non-existent T-time-call 911 as soon as signs and symptoms begin-DO NOT go Back to bed or wait to see if you get better-TIME IS BRAIN. Warning Signs of HEART ATTACK Call 911 if you have these symptoms: 
? Chest discomfort. Most heart attacks involve discomfort in the center of the chest that lasts more than a few minutes, or that goes away and comes back. It can feel like uncomfortable pressure, squeezing, fullness, or pain. ? Discomfort in other areas of the upper body. Symptoms can include pain or discomfort in one or both arms, the back, neck, jaw, or stomach. ? Shortness of breath with or without chest discomfort. ? Other signs may include breaking out in a cold sweat, nausea, or lightheadedness. Don't wait more than five minutes to call [address]! Fast action can save your life. Calling 911 is almost always the fastest way to get lifesaving treatment. Emergency Medical Services staff can begin treatment when they arrive  up to an hour sooner than if someone gets to the hospital by car. The discharge information has been reviewed with the patient. The patient verbalized understanding. Discharge medications reviewed with the patient and appropriate educational materials and side effects teaching were provided. ___________________________________________________________________________________________________________________________________ MyChart Announcement We are excited to announce that we are making your provider's discharge notes available to you in [employer].   You will see these notes when they are completed and signed by the physician that discharged you from your recent hospital stay. If you have any questions or concerns about any information you see in [employer], please call the Health Information Department where you were seen or reach out to your Primary Care Provider for more information about your plan of care. Introducing [facility]! [facility] introduces [employer] patient portal. Now you can access parts of your medical record, email your doctor's office, and request medication refills online. 1. In your internet browser, go to [email or web address] 2. Click on the First Time User? Click Here link in the Sign In box. You will see the New Member Sign Up page. 3. Enter your [employer] Access Code exactly as it appears below. You will not need to use this code after youve completed the sign-up process. If you do not sign up before the expiration date, you must request a new code. · [employer] Access Code: [email or web address] Expires: 10/8/2018 11:34 AM 
 
4. Enter the last four digits of your Social Security Number (xxxx) and Date of Birth (mm/dd/yyyy) as indicated and click Submit. You will be taken to the next sign-up page. 5. Create a [employer] ID. This will be your [employer] login ID and cannot be changed, so think of one that is secure and easy to remember. 6. Create a [employer] password. You can change your password at any time. 7. Enter your Password Reset Question and Answer. This can be used at a later time if you forget your password. 8. Enter your e-mail address. You will receive e-mail notification when new information is available in [address] 9. Click Sign Up. You can now view and download portions of your medical record. 10. Click the Download Summary menu link to download a portable copy of your medical information.  
 
If you have questions, please visit the Frequently Asked Questions section of the [employer]. Remember, MyChart is NOT to be used for urgent needs. For medical emergencies, dial 911. Now available from your iPhone and Android! Introducing Sudheer Doyle As a [facility] [employer] [employer] [facility] patient, I wanted to make you aware of our electronic visit tool called Sudheer Doyle. [employer] allows you to connect within minutes with a medical provider 24 hours a day, seven days a week via a mobile device or tablet or logging into a secure website from your computer. You can access Sudheer Doyle from anywhere in the [place]. A virtual visit might be right for you when you have a simple condition and feel like you just dont want to get out of bed, or cant get away from work for an appointment, when your regular [facility] provider is not available (evenings, weekends or holidays), or when youre out of town and need minor care. Electronic visits cost only $49 and if the [employer] provider determines a prescription is needed to treat your condition, one can be electronically transmitted to a nearby pharmacy*. Please take a moment to enroll today if you have not already done so. The enrollment process is free and takes just a few minutes. To enroll, please download the [employer] joselin to your tablet or phone, or visit [email or web address] to enroll on your computer. And, as an [address] [place] [address] patient with a [email or web address] account, the results of your visits will be scanned into your electronic medical record and your primary care provider will be able to view the scanned results. We urge you to continue to see your regular [facility] provider for your ongoing medical care. And while your primary care provider may not be the one available when you seek a Sudheer Doyle virtual visit, the peace of mind you get from getting a real diagnosis real time can be priceless. For more information on Sudheer Doyle, view our Frequently Asked Questions (FAQs) at [email or web address]. Sincerely, 
 
Paresh Ulloa MD 
Chief Medical Officer [employer] *:  certain medications cannot be prescribed via Sudheer Doyle Providers Seen During Your Hospitalization Provider Specialty Primary office phone Ziyad Asher MD Cardiology [phone] Your Primary Care Physician (PCP) Primary Care Physician Office Phone Office Fax Jordi Alicia [phone] You are allergic to the following No active allergies Recent Documentation Height Weight Breastfeeding? BMI Smoking Status 1.6 m 56.3 kg No 21.98 kg/m2 Never Smoker Emergency Contacts Name Discharge Info Relation Home Work Mobile [address] CAREGIVER [3] Spouse [3] [phone] Patient Belongings The following personal items are in your possession at time of discharge: 
  Dental Appliances: None  Visual Aid: None      Home Medications: None   Jewelry: None  Clothing: At bedside    Other Valuables: Cell Phone Discharge Instructions Attachments/References CARDIAC REHABILITATION (ENGLISH) Patient Handouts Cardiac Rehabilitation: Care Instructions Your Care Instructions Cardiac rehabilitation is a program for people who have a heart problem, such as a heart attack, heart failure, or a heart valve disease. The program includes exercise, lifestyle changes, education, and emotional support. Cardiac rehab can help you improve the quality of your life through better overall health. It can help you lose weight and feel better about yourself. On your cardiac rehab team, you may have your doctor, a nurse specialist, a dietitian, and a physical therapist. They will design your cardiac rehab program specifically for you.  You will learn how to reduce your risk for heart problems, how to manage stress, and how to eat a heart-healthy diet. By the end of the program, you will be ready to maintain a healthier lifestyle on your own. Follow-up care is a key part of your treatment and safety. Be sure to make and go to all appointments, and call your doctor if you are having problems. It's also a good idea to know your test results and keep a list of the medicines you take. How can you care for yourself at home? · Take your medicines exactly as prescribed. Call your doctor if you think you are having a problem with your medicine. You will get more details on the specific medicines your doctor prescribes. · Weigh yourself every day if your doctor tells you to. Watch for sudden weight gain. Weigh yourself on the same scale with the same amount of clothing at the same time of day. · Plan your meals so that you are eating heart-healthy foods. ¨ Eat a variety of foods daily. Fresh fruits and vegetables and whole-grains are good choices. ¨ Limit your fat intake, especially saturated and trans fat. ¨ Limit salt (sodium). ¨ Increase fiber in your diet. ¨ Limit alcohol. · Learn how to take your pulse so that you can track your heart rate during exercise. · Always check with your doctor before you begin a new exercise program. 
· Warm up before you exercise and cool down afterward for at least 15 minutes each. This will help your heart gradually prepare for and recover from exercise and avoid pushing your heart too hard. · Stop exercising if you have any unusual discomfort, such as chest pain. · Do not smoke. Smoking can make heart problems worse. If you need help quitting, talk to your doctor about stop-smoking programs and medicines. These can increase your chances of quitting for good. When should you call for help? Call 911 anytime you think you may need emergency care. For example, call if: 
  · You have severe trouble breathing.   · You cough up pink, foamy mucus and you have trouble breathing.  
  · You have symptoms of a heart attack. These may include: ¨ Chest pain or pressure, or a strange feeling in the chest. 
¨ Sweating. ¨ Shortness of breath. ¨ Nausea or vomiting. ¨ Pain, pressure, or a strange feeling in the back, neck, jaw, or upper belly or in one or both shoulders or arms. ¨ Lightheadedness or sudden weakness. ¨ A fast or irregular heartbeat. After you call 911, the  may tell you to chew 1 adult-strength or 2 to 4 low-dose aspirin. Wait for an ambulance. Do not try to drive yourself.  
  · You have angina symptoms (such as chest pain or pressure) that do not go away with rest or are not getting better within 5 minutes after you take a dose of nitroglycerin.  
  · You have symptoms of a stroke. These may include: 
¨ Sudden numbness, tingling, weakness, or loss of movement in your face, arm, or leg, especially on only one side of your body. ¨ Sudden vision changes. ¨ Sudden trouble speaking. ¨ Sudden confusion or trouble understanding simple statements. ¨ Sudden problems with walking or balance. ¨ A sudden, severe headache that is different from past headaches.  
  · You passed out (lost consciousness).  
 Call your doctor now or seek immediate medical care if: 
  · You have new or increased shortness of breath.  
  · You are dizzy or lightheaded, or you feel like you may faint.  
  · You gain weight suddenly, such as more than 2 to 3 pounds in a day or 5 pounds in a week. (Your doctor may suggest a different range of weight gain.)  
  · You have increased swelling in your legs, ankles, or feet.  
 Watch closely for changes in your health, and be sure to contact your doctor if you have any problems. Where can you learn more? Go to http://delma-antonia.info/. Enter V080 in the search box to learn more about \"Cardiac Rehabilitation: Care Instructions. \" Current as of: December 6, 2017 Content Version: 11.7 © 0095-2466 [employer]. Care instructions adapted under license by [employer] (which disclaims liability or warranty for this information). If you have questions about a medical condition or this instruction, always ask your healthcare professional. [address] any warranty or liability for your use of this information. Please provide this summary of care documentation to your next provider. Signatures-by signing, you are acknowledging that this After Visit Summary has been reviewed with you and you have received a copy. Patient Signature:  ____________________________________________________________ Date:  ____________________________________________________________  
  
[facility] Provider Signature:  ____________________________________________________________ Date:  ____________________________________________________________

## 2018-10-02 NOTE — PROGRESS NOTES
Physical Exam  
Admission head to toe skin assessment performed. All skin found to be pink, intact, and blanchable.

## 2018-10-02 NOTE — PROGRESS NOTES
TRANSFER - OUT REPORT: 
 
R radial [facility] with Dr Franc Vega Versed 2 mg Fentanyl 50 mcg Plavix 525 mg Ancef 1 g Angiomax off 1425 Stent to the PDA Stent to the Circ Stent to the LAD Angioseal to the right groin Site covered with tegaderm No bleeding or hematoma noted at site. Site soft Verbal report given to Luz Elena(name) on Sonya Almeida  being transferred to [facility](unit) for routine progression of care Report consisted of patients Situation, Background, Assessment and  
Recommendations(SBAR). Information from the following report(s) Procedure Summary was reviewed with the receiving nurse. Lines:  
Peripheral IV 10/02/18 Right Arm (Active) Opportunity for questions and clarification was provided. Patient transported with: 
 Registered Nurse

## 2018-10-02 NOTE — IP AVS SNAPSHOT
[address] [address] 
[phone] Patient: Cruz Agarwal MRN: [id number] [facility]:[id number]/43/8221 A check betty indicates which time of day the medication should be taken. My Medications CHANGE how you take these medications Instructions Each Dose to Equal  
 Morning Noon Evening Bedtime  
 nitroglycerin 0.4 mg SL tablet Commonly known as:  NITROSTAT What changed:  Another medication with the same name was removed. Continue taking this medication, and follow the directions you see here. 1 Tab by SubLINGual route every five (5) minutes as needed for Chest Pain. Up to 3 doses. 0.4 mg  
    
   
   
   
  
  
CONTINUE taking these medications Instructions Each Dose to Equal  
 Morning Noon Evening Bedtime  
 aspirin 81 mg chewable tablet Take 1 Tab by mouth daily. 81 mg  
    
  
   
   
   
  
 clopidogrel 75 mg Tab Commonly known as:  PLAVIX Take 1 Tab by mouth daily. 75 mg  
    
  
   
   
   
  
 donepezil 5 mg tablet Commonly known as:  ARICEPT Take  by mouth nightly. estradiol 0.5 mg tablet Commonly known as:  ESTRACE  
   
 TAKE 1 TABLET BY MOUTH EVERY DAY  
     
  
   
   
   
  
 isosorbide mononitrate ER 30 mg tablet Commonly known as:  IMDUR TK 1 T PO QD IN THE MORNING  
     
  
   
   
   
  
 levothyroxine 88 mcg tablet Commonly known as:  SYNTHROID  
   
 TAKE 1 TABLET BY MOUTH EVERY DAY  
     
  
   
   
   
  
 memantine 10 mg tablet Commonly known as:  Rome Peal 10mg BID  
     
  
   
   
  
   
  
 metFORMIN  mg tablet Commonly known as:  GLUCOPHAGE XR Take 750 mg by mouth. 750 mg  
    
  
   
   
   
  
 metoprolol succinate 25 mg XL tablet Commonly known as:  TOPROL-XL  
   
 TAKE 1 TABLET BY MOUTH EVERY DAY  
     
  
   
   
   
  
 pantoprazole 40 mg tablet Commonly known as:  PROTONIX TAKE 1 TABLET BY MOUTH EVERY DAY  
     
  
   
   
   
  
 potassium chloride SA 10 mEq capsule Commonly known as:  Jeffie Drum Take 10 mEq by mouth two (2) times a day. 10 mEq  
    
  
   
   
  
   
  
 pravastatin 40 mg tablet Commonly known as:  PRAVACHOL Take 40 mg by mouth. 40 mg  
    
   
   
   
  
  
 ranolazine  mg SR tablet Commonly known as:  RANEXA Take 1 Tab by mouth two (2) times a day. 500 mg  
    
  
   
   
  
   
  
 venlafaxine-SR 75 mg capsule Commonly known as:  EFFEXOR-XR  
   
 TAKE 1 CAPSULE BY MOUTH EVERY DAY  
     
  
   
   
   
  
 VITAMIN B-12 1,000 mcg tablet Generic drug:  cyanocobalamin Take 1,000 mcg by mouth daily. 1000 mcg

## 2018-10-02 NOTE — PROGRESS NOTES
Patient received to [address] [place] [address] room # 12  Ambulatory from [facility]. Patient scheduled for [facility] today with Dr Brianna Phillips. Procedure reviewed & questions answered, voiced good understanding consent obtained & placed on chart. All medications and medical history reviewed. Will prep patient per orders. Patient & family updated on plan of care. The patient has a fraility score of 4-VULNERABLE, based on ability to do ADLs by self with dementia

## 2018-10-02 NOTE — PROCEDURES
[address] [facility] CATH    Emmalene Schwab.  MR#: [id number]  : 1945  ACCOUNT #: [de-identified]   DATE OF SERVICE: 10/02/2018    PROCEDURE:  Multivessel percutaneous coronary intervention. INDICATION:  The patient with known multivessel coronary artery disease. She is felt to be not a surgical candidate due to underlying dementia. Attempts at medical therapy have been unsuccessful despite beta blocker, nitrates and Ranexa. The patient with continued anginal symptoms. High risk multivessel PCI arranged by Dr. Howard Munguia. TECHNICAL FACTORS:  After informed consent was obtained, the patient brought to cardiac catheterization lab. The right femoral area was prepped and draped in usual sterile fashion. Using Site-Rite ultrasound, the right common femoral artery was entered. A 6-Faroese arterial sheath was placed without difficulty. The patient at this point had been preloaded with Plavix and was given intravenous Angiomax. Multivessel PCI was then performed. LESION #1:  Ostial right PDA, prestenosis 95%, post-stenosis 0%. TECHNICAL FACTORS:  A JR4 guide was used to selectively engage the ostium of the right coronary artery. A Prowater wire was placed in the right posterolateral branch for protection and a Whisper wire was placed in the right PDA. The right PDA was directly stented with a Resolute Montezuma 2.25 x 22 mm drug-eluting stent with a stent extending from the right proximal PDA into the distal right coronary artery. At this point, an NC Euphora 2.5 x 12 mm balloon was positioned and deployed on multiple occasions up to 18 atmospheres for appropriate post-dilatation of the PDA and distal right coronary artery. At this point, the distal right coronary artery was felt to be a size mismatch and a 3.0 x 12 mm Euphora compliant balloon was positioned and deployed in the distal right coronary artery to 14 atmospheres.   Following proximal optimization of the stent, the wire was removed and final orthogonal projections were obtained. There was no residual stenosis in the ostium of the right PDA. The right posterolateral branch did not contain any impingement or reduced flow. Based on this, no intervention was undertaken to the right posterolateral branch. LESION #2:  Proximal circumflex, pre-stenosis 80%, post-stenosis 0%. TECHNICAL FACTORS:  An XB 3.0 guide was used to selectively engage the ostium of the left main coronary artery. A Prowater wire was placed in the distal circumflex. The circumflex was predilated with a NC Euphora 3.5 x 12 mm balloon to 18 atmospheres. Following this, a Resolute Sergio 2.75 x 26 mm drug-eluting stent was positioned and deployed to 16 atmospheres. Following this, a 3.0 x 12 mm NC Euphora balloon was positioned, deployed up to 22 atmospheres. Following post-dilatation, there was excellent angiographic result with no residual stenosis. BENTLEY 3 distal flow. The wire was removed. LESION #3:  Mid LAD, pre-stenosis 70% in-stent, post-stenosis 0%. TECHNICAL FACTORS:  Patient had diffuse in-stent restenosis of the LAD. I was uncertain if this was hemodynamically significant. Based on this, a Verrata pressure wire was advanced into the proximal LAD and appropriately normalized. The patient was given 200 mcg of nitroglycerin and the wire was advanced across the lesion. IFR was measured 0.77 indicating a hemodynamically significant stenosis. Based on this, the Prowater wire was placed in the distal LAD. The mid LAD was then predilated with a 2.25 x 15 mm NC Euphora balloon to 18 atmospheres. Following predilatation, a Resolute Sergio 2.25 x 30 mm drug-eluting stent was positioned and deployed to 14 atmospheres. Following postdilatation, a 2.5 x 12 mm NC Euphora balloon was positioned and deployed on 3 separate occasions to 18 atmospheres. Final post-dilatation 150 mcg nitroglycerin was administered.   The wire was removed and final orthogonal projections were obtained. There was excellent angiographic result with no residual stenosis. CONCLUSIONS:    1. Successful multivessel PCI. 2.  Successful PCI to the ostial right PDA with stent extended to the distal right coronary artery with Concepcion 2.25 x 22 mm drug-eluting stent. This postdilated to 2.5 mm in the PDA and 3.0 mm in the distal right coronary artery. 3.  Successful PCI and stenting of the proximal circumflex with Resolute Concepcion 2.75 x 26 mm drug-eluting stent. This postdilated to 3.0 mm of noncompliant balloon. 4.  Successful IFR assessment of mid LAD. This mid LAD stenosis was hemodynamically significant with IFR of 0.77. This confirmed with pullback across the lesion. 5.  Successful PCI and stenting to mid LAD with a Resolute Sergio 2.25 x 30 mm drug-eluting stent. This postdilated to 2.5 mm diameter. NOTE:  At conclusion of procedure, right femoral arteriogram was performed and showed a sheath was in the right common femoral artery. An Angio-Seal vascular closure device was deployed by standard technique with good hemostasis.       PLAN:  The patient will be admitted to observation for close monitoring following PCI given her dementia and concerns of access site bleeding and compliance with her bed rest.      MD AUGIE Corbett / [place]  D: 10/02/2018 15:01     T: 10/02/2018 15:22  JOB #: [id number]

## 2018-10-03 VITALS
DIASTOLIC BLOOD PRESSURE: 53 MMHG | BODY MASS INDEX: 21.99 KG/M2 | TEMPERATURE: 97.5 F | SYSTOLIC BLOOD PRESSURE: 120 MMHG | HEIGHT: 63 IN | RESPIRATION RATE: 16 BRPM | OXYGEN SATURATION: 97 % | WEIGHT: 124.1 LBS | HEART RATE: 76 BPM

## 2018-10-03 LAB
ANION GAP SERPL CALC-SCNC: 8 MMOL/L (ref 7–16)
ATRIAL RATE: 63 BPM
BASOPHILS # BLD: 0 K/UL (ref 0–0.2)
BASOPHILS NFR BLD: 0 % (ref 0–2)
BUN SERPL-MCNC: 12 MG/DL (ref 8–23)
CALCIUM SERPL-MCNC: 7.6 MG/DL (ref 8.3–10.4)
CALCULATED P AXIS, ECG09: 34 DEGREES
CALCULATED R AXIS, ECG10: 9 DEGREES
CALCULATED T AXIS, ECG11: 47 DEGREES
CHLORIDE SERPL-SCNC: 107 MMOL/L (ref 98–107)
CHOLEST SERPL-MCNC: 143 MG/DL
CO2 SERPL-SCNC: 23 MMOL/L (ref 21–32)
CREAT SERPL-MCNC: 0.7 MG/DL (ref 0.6–1)
DIAGNOSIS, 93000: NORMAL
DIFFERENTIAL METHOD BLD: ABNORMAL
EOSINOPHIL # BLD: 0.2 K/UL (ref 0–0.8)
EOSINOPHIL NFR BLD: 3 % (ref 0.5–7.8)
ERYTHROCYTE [DISTWIDTH] IN BLOOD BY AUTOMATED COUNT: 12.6 %
GLUCOSE BLD STRIP.AUTO-MCNC: 127 MG/DL (ref 65–100)
GLUCOSE SERPL-MCNC: 119 MG/DL (ref 65–100)
HCT VFR BLD AUTO: 32.6 % (ref 35.8–46.3)
HDLC SERPL-MCNC: 36 MG/DL (ref 40–60)
HDLC SERPL: 4 {RATIO}
HGB BLD-MCNC: 11.3 G/DL (ref 11.7–15.4)
IMM GRANULOCYTES # BLD: 0 K/UL (ref 0–0.5)
IMM GRANULOCYTES NFR BLD AUTO: 0 % (ref 0–5)
LDLC SERPL CALC-MCNC: 76.2 MG/DL
LIPID PROFILE,FLP: ABNORMAL
LYMPHOCYTES # BLD: 0.3 K/UL (ref 0.5–4.6)
LYMPHOCYTES NFR BLD: 6 % (ref 13–44)
MAGNESIUM SERPL-MCNC: 1.8 MG/DL (ref 1.8–2.4)
MCH RBC QN AUTO: 31 PG (ref 26.1–32.9)
MCHC RBC AUTO-ENTMCNC: 34.7 G/DL (ref 31.4–35)
MCV RBC AUTO: 89.3 FL (ref 79.6–97.8)
MONOCYTES # BLD: 0.4 K/UL (ref 0.1–1.3)
MONOCYTES NFR BLD: 7 % (ref 4–12)
NEUTS SEG # BLD: 4.2 K/UL (ref 1.7–8.2)
NEUTS SEG NFR BLD: 83 % (ref 43–78)
NRBC # BLD: 0 K/UL (ref 0–0.2)
P-R INTERVAL, ECG05: 138 MS
PLATELET # BLD AUTO: 225 K/UL (ref 150–450)
PMV BLD AUTO: 9.8 FL (ref 9.4–12.3)
POTASSIUM SERPL-SCNC: 3.8 MMOL/L (ref 3.5–5.1)
Q-T INTERVAL, ECG07: 432 MS
QRS DURATION, ECG06: 74 MS
QTC CALCULATION (BEZET), ECG08: 442 MS
RBC # BLD AUTO: 3.65 M/UL (ref 4.05–5.2)
SODIUM SERPL-SCNC: 138 MMOL/L (ref 136–145)
TRIGL SERPL-MCNC: 154 MG/DL (ref 35–150)
VENTRICULAR RATE, ECG03: 63 BPM
VLDLC SERPL CALC-MCNC: 30.8 MG/DL (ref 6–23)
WBC # BLD AUTO: 5 K/UL (ref 4.3–11.1)

## 2018-10-03 PROCEDURE — 36415 COLL VENOUS BLD VENIPUNCTURE: CPT

## 2018-10-03 PROCEDURE — 82962 GLUCOSE BLOOD TEST: CPT

## 2018-10-03 PROCEDURE — 80048 BASIC METABOLIC PNL TOTAL CA: CPT

## 2018-10-03 PROCEDURE — 85025 COMPLETE CBC W/AUTO DIFF WBC: CPT

## 2018-10-03 PROCEDURE — 74011250637 HC RX REV CODE- 250/637: Performed by: INTERNAL MEDICINE

## 2018-10-03 PROCEDURE — 99218 HC RM OBSERVATION: CPT

## 2018-10-03 PROCEDURE — 93005 ELECTROCARDIOGRAM TRACING: CPT | Performed by: INTERNAL MEDICINE

## 2018-10-03 PROCEDURE — 80061 LIPID PANEL: CPT

## 2018-10-03 PROCEDURE — 83735 ASSAY OF MAGNESIUM: CPT

## 2018-10-03 RX ADMIN — PANTOPRAZOLE SODIUM 40 MG: 40 TABLET, DELAYED RELEASE ORAL at 09:14

## 2018-10-03 RX ADMIN — POTASSIUM CHLORIDE 10 MEQ: 10 TABLET, EXTENDED RELEASE ORAL at 09:14

## 2018-10-03 RX ADMIN — ACETAMINOPHEN 650 MG: 325 TABLET ORAL at 01:19

## 2018-10-03 RX ADMIN — ISOSORBIDE MONONITRATE 30 MG: 30 TABLET, EXTENDED RELEASE ORAL at 09:14

## 2018-10-03 RX ADMIN — ASPIRIN 81 MG 81 MG: 81 TABLET ORAL at 09:14

## 2018-10-03 RX ADMIN — MEMANTINE HYDROCHLORIDE 10 MG: 5 TABLET ORAL at 09:13

## 2018-10-03 RX ADMIN — RANOLAZINE 500 MG: 500 TABLET, FILM COATED, EXTENDED RELEASE ORAL at 09:13

## 2018-10-03 RX ADMIN — Medication 10 ML: at 06:00

## 2018-10-03 RX ADMIN — LEVOTHYROXINE SODIUM 88 MCG: 88 TABLET ORAL at 09:13

## 2018-10-03 RX ADMIN — CLOPIDOGREL BISULFATE 75 MG: 75 TABLET ORAL at 09:13

## 2018-10-03 RX ADMIN — METOPROLOL SUCCINATE 25 MG: 25 TABLET, EXTENDED RELEASE ORAL at 09:13

## 2018-10-03 RX ADMIN — VENLAFAXINE HYDROCHLORIDE 75 MG: 75 CAPSULE, EXTENDED RELEASE ORAL at 09:14

## 2018-10-03 NOTE — DISCHARGE SUMMARY
[facility] Discharge Summary     Patient ID:  Reema Daly  [id number]  43 y.o.  1945    Admit date: 10/2/2018    Discharge date:  10/3/2018    Admitting Physician: Lisa Stern MD     Discharge Physician: Dr. Anupama Lopez    Admission Diagnoses: chest pain    Discharge Diagnoses:    Diagnosis    Post PTCA    Coronary artery disease involving native coronary artery of native heart    Chest discomfort    Dementia without behavioral disturbance    Essential hypertension with goal blood pressure less than 130/85    Chest pain    Type 2 diabetes mellitus without complication, without long-term current use of insulin (HCC)    Mixed hyperlipidemia    S/P PTCA (percutaneous transluminal coronary angioplasty)       Cardiology Procedures this admission:  Left heart catheterization with PCI  Consults: None    Hospital Course: Patient was seen at the office of [facility] by Dr. Coleen Ormond for complaints of chest pain and was subsequently scheduled for an AM Admission LH at [facility] on 10/2/18. Patient underwent cardiac catheterization by Dr. Anupama Lopez. Patient was found to have 95% stenosis of the ostial right PDA that was stented with a 2.25 x 22-mm Resolute [place] LYLY with 0% residual stenosis. Patient was also found to have 80% stenosis of the proximal left circumflex that was stented with a 2.75 x 26-mm Resolute [place] LYLY with 0% residual stenosis. Patient was also found to have 70% stenosis of mid LAD that was stented with 2.25 x 30-mm Resolute [place] LYLY with 0% residual stenosis. Patient tolerated the procedure well and was taken to the telemetry floor for recovery. The morning of discharge, patient was up feeling well without any complaints of chest pain or shortness of breath. Patient's right groin cath site was clean, dry and intact without hematoma or bruit. Patient's labs were WNL. Patient was seen and examined by Dr. Anupama Lopez and determined stable and ready for discharge. Patient was instructed on the importance of medication compliance including taking aspirin and Plavix everyday without missing a dose. After receiving drug eluting stents, the patient will remain on dual anti-platelet therapy for 1 year. For maximized medical therapy for CAD, patient will continue BB and statin as well. The patient will follow up with [facility] -- Dr. Chasity Benavides in 2-4 weeks. Patient has been referred to cardiac rehab. Will defer to Dr. Chasity Benavides regarding possible changing of statin therapy given her lipid profile. DISPOSITION: The patient is being discharged home in stable condition on a low saturated fat, low cholesterol and low salt diet. The patient is instructed to advance activities as tolerated to the limit of fatigue or shortness of breath. The patient is instructed to avoid all heavy lifting, straining, stooping or squatting for 3-5 days. The patient is instructed to watch the cath site for bleeding/oozing; if seen, the patient is instructed to apply firm pressure with a clean cloth and call [facility] at [phone]. The patient is instructed to watch for signs of infection which include: increasing area of redness, fever/hot to touch or purulent drainage at the catheterization site. The patient is instructed not to soak in a bathtub for 7-10 days, but is cleared to shower. The patient is instructed to call the office or return to the ER for immediate evaluation for any shortness of breath or chest pain not relieved by NTG. Discharge Exam: Patient has been seen by Dr. Haresh Greene: see his progress note for exam details.     Visit Vitals    /66 (BP 1 Location: Left arm)    Pulse 72    Temp 98 °F (36.7 °C)    Resp 18    Ht 5' 3\" (1.6 m)    Wt 56.3 kg (124 lb 1.6 oz)    SpO2 98%    Breastfeeding No    BMI 21.98 kg/m2       Recent Results (from the past 24 hour(s))   EKG, 12 LEAD, INITIAL    Collection Time: 10/02/18  9:50 AM   Result Value Ref Range Ventricular Rate 51 BPM    Atrial Rate 51 BPM    P-R Interval 140 ms    QRS Duration 80 ms    Q-T Interval 448 ms    QTC Calculation (Bezet) 412 ms    Calculated P Axis 30 degrees    Calculated R Axis 19 degrees    Calculated T Axis 40 degrees    Diagnosis       Sinus bradycardia  Otherwise normal ECG  No previous ECGs available  Confirmed by PHUONG CORNELIUS (), LEYDA THIBODEAUX ([id number]) on 10/2/2018 10:27:33 AM     CBC W/O DIFF    Collection Time: 10/02/18 10:17 AM   Result Value Ref Range    WBC 5.4 4.3 - 11.1 K/uL    RBC 4.04 (L) 4.05 - 5.2 M/uL    HGB 12.6 11.7 - 15.4 g/dL    HCT 36.9 35.8 - 46.3 %    MCV 91.3 79.6 - 97.8 FL    MCH 31.2 26.1 - 32.9 PG    MCHC 34.1 31.4 - 35.0 g/dL    RDW 13.1 %    PLATELET 366 255 - 540 K/uL    MPV 9.8 9.4 - 12.3 FL    ABSOLUTE NRBC 0.00 0.0 - 0.2 K/uL   METABOLIC PANEL, BASIC    Collection Time: 10/02/18 10:17 AM   Result Value Ref Range    Sodium 140 136 - 145 mmol/L    Potassium 3.2 (L) 3.5 - 5.1 mmol/L    Chloride 103 98 - 107 mmol/L    CO2 29 21 - 32 mmol/L    Anion gap 8 7 - 16 mmol/L    Glucose 131 (H) 65 - 100 mg/dL    BUN 17 8 - 23 MG/DL    Creatinine 0.77 0.6 - 1.0 MG/DL    GFR est AA >60 >60 ml/min/1.73m2    GFR est non-AA >60 >60 ml/min/1.73m2    Calcium 8.7 8.3 - 10.4 MG/DL   MAGNESIUM    Collection Time: 10/02/18 10:17 AM   Result Value Ref Range    Magnesium 1.7 (L) 1.8 - 2.4 mg/dL   EKG, 12 LEAD, INITIAL    Collection Time: 10/02/18  7:26 PM   Result Value Ref Range    Ventricular Rate 76 BPM    Atrial Rate 76 BPM    P-R Interval 152 ms    QRS Duration 68 ms    Q-T Interval 370 ms    QTC Calculation (Bezet) 416 ms    Calculated P Axis 56 degrees    Calculated R Axis 29 degrees    Calculated T Axis 50 degrees    Diagnosis       Normal sinus rhythm  Normal ECG  When compared with ECG of 02-OCT-2018 09:50,  Vent.  rate has increased BY  25 BPM  Confirmed by [facility]  MD (), VALE LAZAR ([id number]) on 10/2/2018 8:12:16 PM     GLUCOSE, POC    Collection Time: 10/02/18  9:38 PM Result Value Ref Range    Glucose (POC) 217 (H) 65 - 100 mg/dL   CBC WITH AUTOMATED DIFF    Collection Time: 10/03/18  3:32 AM   Result Value Ref Range    WBC 5.0 4.3 - 11.1 K/uL    RBC 3.65 (L) 4.05 - 5.2 M/uL    HGB 11.3 (L) 11.7 - 15.4 g/dL    HCT 32.6 (L) 35.8 - 46.3 %    MCV 89.3 79.6 - 97.8 FL    MCH 31.0 26.1 - 32.9 PG    MCHC 34.7 31.4 - 35.0 g/dL    RDW 12.6 %    PLATELET 829 987 - 492 K/uL    MPV 9.8 9.4 - 12.3 FL    ABSOLUTE NRBC 0.00 0.0 - 0.2 K/uL    DF AUTOMATED      NEUTROPHILS 83 (H) 43 - 78 %    LYMPHOCYTES 6 (L) 13 - 44 %    MONOCYTES 7 4.0 - 12.0 %    EOSINOPHILS 3 0.5 - 7.8 %    BASOPHILS 0 0.0 - 2.0 %    IMMATURE GRANULOCYTES 0 0.0 - 5.0 %    ABS. NEUTROPHILS 4.2 1.7 - 8.2 K/UL    ABS. LYMPHOCYTES 0.3 (L) 0.5 - 4.6 K/UL    ABS. MONOCYTES 0.4 0.1 - 1.3 K/UL    ABS. EOSINOPHILS 0.2 0.0 - 0.8 K/UL    ABS. BASOPHILS 0.0 0.0 - 0.2 K/UL    ABS. IMM. GRANS. 0.0 0.0 - 0.5 K/UL         Patient Instructions:   Current Discharge Medication List      CONTINUE these medications which have NOT CHANGED    Details   aspirin 81 mg chewable tablet Take 1 Tab by mouth daily. Qty: 30 Tab, Refills: 11      clopidogrel (PLAVIX) 75 mg tab Take 1 Tab by mouth daily. Qty: 90 Tab, Refills: 3      potassium chloride SA (MICRO-K) 10 mEq capsule Take 10 mEq by mouth two (2) times a day. ranolazine ER (RANEXA) 500 mg SR tablet Take 1 Tab by mouth two (2) times a day. Qty: 60 Tab, Refills: 11      cyanocobalamin (VITAMIN B-12) 1,000 mcg tablet Take 1,000 mcg by mouth daily. donepezil (ARICEPT) 5 mg tablet Take  by mouth nightly.       isosorbide mononitrate ER (IMDUR) 30 mg tablet TK 1 T PO QD IN THE MORNING      levothyroxine (SYNTHROID) 88 mcg tablet TAKE 1 TABLET BY MOUTH EVERY DAY      estradiol (ESTRACE) 0.5 mg tablet TAKE 1 TABLET BY MOUTH EVERY DAY      memantine (NAMENDA) 10 mg tablet 10mg BID      metFORMIN ER (GLUCOPHAGE XR) 750 mg tablet Take 750 mg by mouth.      metoprolol succinate (TOPROL-XL) 25 mg XL tablet TAKE 1 TABLET BY MOUTH EVERY DAY      pantoprazole (PROTONIX) 40 mg tablet TAKE 1 TABLET BY MOUTH EVERY DAY      pravastatin (PRAVACHOL) 40 mg tablet Take 40 mg by mouth. venlafaxine-SR (EFFEXOR-XR) 75 mg capsule TAKE 1 CAPSULE BY MOUTH EVERY DAY      nitroglycerin (NITROSTAT) 0.4 mg SL tablet 1 Tab by SubLINGual route every five (5) minutes as needed for Chest Pain. Up to 3 doses.   Qty: 30 Tab, Refills: 5

## 2018-10-03 NOTE — PROGRESS NOTES
Cardiac Rehab: Spoke with patient regarding referral to cardiac rehab. Patient meets admission criteria based on PCI (10/2/18). Written information about Cardiac Rehab given and reviewed with patient. Discussed lifestyle modifications to promote cardiac wellness. Patient indicated that she wants to participate in the cardiac rehab program at the [facility] which is closer to her home in [place]. Her Cardiologist is Dr. Ann Sanchez. Thank you, CHAZ De La TorreN, RN Cardiopulmonary Rehabilitation Nurse Liaison [facility]

## 2018-10-03 NOTE — PROGRESS NOTES
Verbal bedside report given to Leigh Mike, oncoming RN. Patient's situation, background, assessment and recommendations provided. Opportunity for questions provided. Oncoming RN assumed care of patient.

## 2018-10-03 NOTE — PROGRESS NOTES
Bedside and Verbal shift change report given to self (oncoming nurse) by Gideon Sears RN (offgoing nurse). Report included the following information SBAR, Kardex, Intake/Output, MAR and Recent Results.

## 2018-10-03 NOTE — DISCHARGE INSTRUCTIONS
Cardiac Catheterization/Angiography Discharge Instructions    *Check the puncture site frequently for swelling or bleeding. If you see any bleeding, lie down and apply pressure over the area with a clean town or washcloth. Notify your doctor for any redness, swelling, drainage or oozing from the puncture site. Notify your doctor for any fever or chills. *If the leg or arm with the puncture becomes cold, numb or painful, call [facility] at [phone]    *Activity should be limited for the next 48 hours. Climb stairs as little as possible and avoid any stooping, bending or strenuous activity for 48 hours. No heavy lifting (anything over 10 pounds) for three days. *Do not drive for 48 hours. *You may resume your usual diet. Drink more fluids than usual.    *Have a responsible person drive you home and stay with you for at least 24 hours after your heart catheterization/angiography. *You may remove the bandage from your Right and Groin in 24 hours. You may shower in 24 hours. No tub baths, hot tubs or swimming for one week. Do not place any lotions, creams, powders, ointments over the puncture site for one week. You may place a clean band-aid over the puncture site each day for 5 days. Change this daily. Learning About Percutaneous Coronary Intervention  What is percutaneous coronary intervention? Percutaneous coronary intervention (PCI) is the name for procedures to open a blocked coronary artery. The two most common are coronary angioplasty and coronary stent placement. A PCI is a way to open a blocked coronary artery before, during, or after a heart attack. It gets blood flowing to your heart. And it can help prevent heart problems by widening an artery that has been narrowed by fatty buildup (plaque). This also may be called balloon angioplasty. Before a PCI, a doctor does a test to find blocked arteries. The test is called cardiac catheterization.  The doctor puts a tiny tube called a catheter into an artery in your groin or arm. The doctor moves the catheter through the artery to your heart. Then he or she puts a dye into the catheter. This makes your heart's arteries show up on a screen so the doctor can see any blockages. The test also can measure the pressure inside your heart's chambers. If you have a blocked artery, the doctor may do an angioplasty or a coronary stent procedure. In an angioplasty, the doctor uses a catheter with a tiny balloon at the tip. He or she puts it into the blocked area and inflates it. The balloon presses the plaque against the walls of the artery. This makes more room for blood to flow. In most cases, the doctor then puts a stent in the artery. A stent is a small, expandable tube that presses against the walls of the artery. The stent is left in the artery to keep the artery open. This helps blood flow. It also may keep small pieces of plaque from breaking off and causing a heart attack. How is PCI done? PCI is done in a cardiac catheterization laboratory (\"cath lab\"). It is done by a heart specialist called a cardiologist. The whole procedure may take 1½ to 3 hours. You lie on a table under a large X-ray machine. You will get medicine through an IV in one of your veins. It helps you relax and not feel pain. You will be awake during the procedure. But you may not be able to remember much about it. The doctor will inject some medicine into your arm or groin to numb the skin. You will feel a small needle stick. It's like having a blood test. You may feel some pressure when the doctor puts in the catheter. But you will not feel pain. The doctor will look at X-ray pictures on a monitor (like a TV set) to move the catheter to your heart. You may feel warm or flushed for a short time when the doctor injects dye into your artery. The doctor then will inflate a tiny balloon at the end of the catheter. The balloon is inflated for a brief time.  Then it is deflated and removed. The doctor also may use the catheter to put a stent in the artery. What can you expect after PCI? The catheter will be removed. A nurse or doctor may press on a bandage on the opening. Then a bandage or a compression device may be placed on your groin or wrist at the catheter insertion site. This prevents bleeding. After the test, you will be taken to a room where the catheter site and your heart rate, blood pressure, and temperature will be checked several times. If the catheter was put in your groin, you will need to lie still and keep your leg straight for several hours. If the catheter was put in your arm, you may be able to sit up and get out of bed right away. But you will need to keep your arm still for at least one hour. The average hospital stay is 1 to 2 days for most procedures. When you go home, you will get instructions from your doctor to help you recover well and prevent problems. Make sure to drink plenty of fluids (unless your doctor tells you not to) for several hours after the test. This will help flush the dye out of your body. Your doctor will prescribe blood-thinning medicines. You will likely take aspirin plus another blood thinner. It is very important that you take these medicines exactly as directed. They help keep the coronary artery open and reduce your risk of a heart attack. If you have this procedure, you will still need to make lifestyle changes like eating healthy, being active, and not smoking. This will give you the best chance for a longer, healthier life. Follow-up care is a key part of your treatment and safety. Be sure to make and go to all appointments, and call your doctor if you are having problems. It's also a good idea to know your test results and keep a list of the medicines you take. Where can you learn more? Go to http://delma-antonia.info/.   Enter E464 in the search box to learn more about \"Learning About Percutaneous Coronary Intervention. \"  Current as of: May 10, 2017  Content Version: 11.7  © 6301-3612 [employer]. Care instructions adapted under license by [employer] (which disclaims liability or warranty for this information). If you have questions about a medical condition or this instruction, always ask your healthcare professional. [place]  any warranty or liability for your use of this information. DISCHARGE SUMMARY from Nurse    PATIENT INSTRUCTIONS:    After general anesthesia or intravenous sedation, for 24 hours or while taking prescription Narcotics:  · Limit your activities  · Do not drive and operate hazardous machinery  · Do not make important personal or business decisions  · Do  not drink alcoholic beverages  · If you have not urinated within 8 hours after discharge, please contact your surgeon on call. Report the following to your surgeon:  · Excessive pain, swelling, redness or odor of or around the surgical area  · Temperature over 100.5  · Nausea and vomiting lasting longer than 4 hours or if unable to take medications  · Any signs of decreased circulation or nerve impairment to extremity: change in color, persistent  numbness, tingling, coldness or increase pain  · Any questions    What to do at Home:  Recommended activity: Activity as tolerated and Activity as tolerated and no driving for today  The patient is being discharged home in stable condition on a low saturated fat, low cholesterol and low salt diet. The patient is instructed to advance activities as tolerated to the limit of fatigue or shortness of breath. The patient is instructed to avoid all heavy lifting, straining, stooping or squatting for 3-5 days. The patient is instructed to watch the cath site for bleeding/oozing; if seen, the patient is instructed to apply firm pressure with a clean cloth and call [address] Cardiology at [phone].  The patient is instructed to watch for signs of infection which include: increasing area of redness, fever/hot to touch or purulent drainage at the catheterization site. The patient is instructed not to soak in a bathtub for 7-10 days, but is cleared to shower. The patient is instructed to call the office or return to the ER for immediate evaluation for any shortness of breath or chest pain not relieved by NTG. *  Please give a list of your current medications to your Primary Care Provider. *  Please update this list whenever your medications are discontinued, doses are      changed, or new medications (including over-the-counter products) are added. *  Please carry medication information at all times in case of emergency situations. These are general instructions for a healthy lifestyle:    No smoking/ No tobacco products/ Avoid exposure to second hand smoke  Surgeon General's Warning:  Quitting smoking now greatly reduces serious risk to your health. Obesity, smoking, and sedentary lifestyle greatly increases your risk for illness    A healthy diet, regular physical exercise & weight monitoring are important for maintaining a healthy lifestyle    You may be retaining fluid if you have a history of heart failure or if you experience any of the following symptoms:  Weight gain of 3 pounds or more overnight or 5 pounds in a week, increased swelling in our hands or feet or shortness of breath while lying flat in bed. Please call your doctor as soon as you notice any of these symptoms; do not wait until your next office visit. Recognize signs and symptoms of STROKE:    F-face looks uneven    A-arms unable to move or move unevenly    S-speech slurred or non-existent    T-time-call 911 as soon as signs and symptoms begin-DO NOT go       Back to bed or wait to see if you get better-TIME IS BRAIN. Warning Signs of HEART ATTACK     Call 911 if you have these symptoms:   Chest discomfort.  Most heart attacks involve discomfort in the center of the chest that lasts more than a few minutes, or that goes away and comes back. It can feel like uncomfortable pressure, squeezing, fullness, or pain.  Discomfort in other areas of the upper body. Symptoms can include pain or discomfort in one or both arms, the back, neck, jaw, or stomach.  Shortness of breath with or without chest discomfort.  Other signs may include breaking out in a cold sweat, nausea, or lightheadedness. Don't wait more than five minutes to call 911 - MINUTES MATTER! Fast action can save your life. Calling 911 is almost always the fastest way to get lifesaving treatment. Emergency Medical Services staff can begin treatment when they arrive -- up to an hour sooner than if someone gets to the hospital by car. The discharge information has been reviewed with the patient. The patient verbalized understanding. Discharge medications reviewed with the patient and appropriate educational materials and side effects teaching were provided.   ___________________________________________________________________________________________________________________________________

## 2018-10-03 NOTE — PROGRESS NOTES
Initial visit was made, prayer, emotional support and a spiritual presence were provided. Thej patient and her  were very thankful for the visit and shared how pleased they were with their overall experience at [facility]. We had a good discussion about their umang. Nicole Matias

## 2018-10-03 NOTE — PROGRESS NOTES
Pt admitted to 3rd floor tele for post PTCA. CM met with pt to discuss CM needs & DCP. Pt is A&Ox4. Pt is indep at home with all ADLS. Pt lives with spouse. Pt has no DME needs. Pt has no difficulty with obtaining medications or transport. DCP home with spouse. No further needs noted. CM to continue to monitor. Care Management Interventions PCP Verified by CM: Yes Last Visit to PCP: 09/17/18 Mode of Transport at Discharge: Other (see comment) Adeel Schaefer [phone]) Transition of Care Consult (CM Consult): Discharge Planning Discharge Durable Medical Equipment: No 
Physical Therapy Consult: No 
Occupational Therapy Consult: No 
Speech Therapy Consult: No 
Current Support Network: Lives with Spouse, Own Home Confirm Follow Up Transport: Family Plan discussed with Pt/Family/Caregiver: Yes Freedom of Choice Offered: Yes Discharge Location Discharge Placement: Home

## 2018-10-04 LAB — ACT BLD: 345 SECS (ref 70–128)

## 2019-05-17 ENCOUNTER — HOSPITAL ENCOUNTER (OUTPATIENT)
Dept: CARDIAC CATH/INVASIVE PROCEDURES | Age: 74
Discharge: HOME | End: 2019-05-18
Attending: INTERNAL MEDICINE | Admitting: INTERNAL MEDICINE
Payer: MEDICARE

## 2019-05-17 ENCOUNTER — HOSPITAL ENCOUNTER (EMERGENCY)
Age: 74
Discharge: STILL ADMITTED OR ADMINISTRATIVE | End: 2019-05-17
Attending: EMERGENCY MEDICINE
Payer: MEDICARE

## 2019-05-17 VITALS
SYSTOLIC BLOOD PRESSURE: 129 MMHG | RESPIRATION RATE: 16 BRPM | HEIGHT: 62 IN | WEIGHT: 120 LBS | OXYGEN SATURATION: 99 % | TEMPERATURE: 98.1 F | DIASTOLIC BLOOD PRESSURE: 70 MMHG | BODY MASS INDEX: 22.08 KG/M2 | HEART RATE: 65 BPM

## 2019-05-17 PROBLEM — I25.10 CAD (CORONARY ARTERY DISEASE): Status: ACTIVE | Noted: 2019-05-17

## 2019-05-17 LAB
ANION GAP SERPL CALC-SCNC: 5 MMOL/L (ref 7–16)
BUN SERPL-MCNC: 14 MG/DL (ref 8–23)
CALCIUM SERPL-MCNC: 9 MG/DL (ref 8.3–10.4)
CHLORIDE SERPL-SCNC: 106 MMOL/L (ref 98–107)
CO2 SERPL-SCNC: 32 MMOL/L (ref 21–32)
CREAT SERPL-MCNC: 0.72 MG/DL (ref 0.6–1)
ERYTHROCYTE [DISTWIDTH] IN BLOOD BY AUTOMATED COUNT: 13 % (ref 11.9–14.6)
GLUCOSE SERPL-MCNC: 123 MG/DL (ref 65–100)
HCT VFR BLD AUTO: 36.3 % (ref 35.8–46.3)
HGB BLD-MCNC: 12.2 G/DL (ref 11.7–15.4)
INR PPP: 0.9
MAGNESIUM SERPL-MCNC: 2 MG/DL (ref 1.8–2.4)
MCH RBC QN AUTO: 30.2 PG (ref 26.1–32.9)
MCHC RBC AUTO-ENTMCNC: 33.6 G/DL (ref 31.4–35)
MCV RBC AUTO: 89.9 FL (ref 79.6–97.8)
NRBC # BLD: 0 K/UL (ref 0–0.2)
PLATELET # BLD AUTO: 255 K/UL (ref 150–450)
PMV BLD AUTO: 10.2 FL (ref 9.4–12.3)
POTASSIUM SERPL-SCNC: 3.7 MMOL/L (ref 3.5–5.1)
PROTHROMBIN TIME: 12.5 SEC (ref 11.7–14.5)
RBC # BLD AUTO: 4.04 M/UL (ref 4.05–5.2)
SODIUM SERPL-SCNC: 143 MMOL/L (ref 136–145)
TROPONIN I BLD-MCNC: 0.01 NG/ML (ref 0.02–0.05)
WBC # BLD AUTO: 4.9 K/UL (ref 4.3–11.1)

## 2019-05-17 PROCEDURE — C1769 GUIDE WIRE: HCPCS

## 2019-05-17 PROCEDURE — 74011250636 HC RX REV CODE- 250/636

## 2019-05-17 PROCEDURE — 75810000275 HC EMERGENCY DEPT VISIT NO LEVEL OF CARE: Performed by: EMERGENCY MEDICINE

## 2019-05-17 PROCEDURE — 77030002888 HC SUT CHRMC J&J -A

## 2019-05-17 PROCEDURE — 99152 MOD SED SAME PHYS/QHP 5/>YRS: CPT

## 2019-05-17 PROCEDURE — 93458 L HRT ARTERY/VENTRICLE ANGIO: CPT

## 2019-05-17 PROCEDURE — 85610 PROTHROMBIN TIME: CPT

## 2019-05-17 PROCEDURE — 74011250637 HC RX REV CODE- 250/637: Performed by: INTERNAL MEDICINE

## 2019-05-17 PROCEDURE — 74011636320 HC RX REV CODE- 636/320: Performed by: INTERNAL MEDICINE

## 2019-05-17 PROCEDURE — 80048 BASIC METABOLIC PNL TOTAL CA: CPT

## 2019-05-17 PROCEDURE — 84484 ASSAY OF TROPONIN QUANT: CPT

## 2019-05-17 PROCEDURE — C1894 INTRO/SHEATH, NON-LASER: HCPCS

## 2019-05-17 PROCEDURE — C1887 CATHETER, GUIDING: HCPCS

## 2019-05-17 PROCEDURE — 99153 MOD SED SAME PHYS/QHP EA: CPT

## 2019-05-17 PROCEDURE — 77030004534 HC CATH ANGI DX INFN CARD -A

## 2019-05-17 PROCEDURE — 77030012468 HC VLV BLEEDBK CNTRL ABBT -B

## 2019-05-17 PROCEDURE — 74011250636 HC RX REV CODE- 250/636: Performed by: INTERNAL MEDICINE

## 2019-05-17 PROCEDURE — 74011000258 HC RX REV CODE- 258: Performed by: INTERNAL MEDICINE

## 2019-05-17 PROCEDURE — 92920 PRQ TRLUML C ANGIOP 1ART&/BR: CPT

## 2019-05-17 PROCEDURE — 77030013794 HC KT TRNSDUC BLD EDWD -B

## 2019-05-17 PROCEDURE — C1725 CATH, TRANSLUMIN NON-LASER: HCPCS

## 2019-05-17 PROCEDURE — 83735 ASSAY OF MAGNESIUM: CPT

## 2019-05-17 PROCEDURE — 85027 COMPLETE CBC AUTOMATED: CPT

## 2019-05-17 RX ORDER — ISOSORBIDE MONONITRATE 60 MG/1
60 TABLET, EXTENDED RELEASE ORAL DAILY
Status: DISCONTINUED | OUTPATIENT
Start: 2019-05-18 | End: 2019-05-18 | Stop reason: HOSPADM

## 2019-05-17 RX ORDER — METOPROLOL SUCCINATE 25 MG/1
25 TABLET, EXTENDED RELEASE ORAL DAILY
Status: DISCONTINUED | OUTPATIENT
Start: 2019-05-18 | End: 2019-05-18 | Stop reason: HOSPADM

## 2019-05-17 RX ORDER — MEMANTINE HYDROCHLORIDE 5 MG/1
10 TABLET ORAL DAILY
Status: DISCONTINUED | OUTPATIENT
Start: 2019-05-18 | End: 2019-05-18 | Stop reason: HOSPADM

## 2019-05-17 RX ORDER — GUAIFENESIN 100 MG/5ML
324 LIQUID (ML) ORAL ONCE
Status: ACTIVE | OUTPATIENT
Start: 2019-05-17 | End: 2019-05-17

## 2019-05-17 RX ORDER — GUAIFENESIN 100 MG/5ML
324 LIQUID (ML) ORAL
Status: DISCONTINUED | OUTPATIENT
Start: 2019-05-17 | End: 2019-05-17

## 2019-05-17 RX ORDER — HYDRALAZINE HYDROCHLORIDE 20 MG/ML
10-20 INJECTION INTRAMUSCULAR; INTRAVENOUS AS NEEDED
Status: DISCONTINUED | OUTPATIENT
Start: 2019-05-17 | End: 2019-05-17 | Stop reason: HOSPADM

## 2019-05-17 RX ORDER — CLOPIDOGREL BISULFATE 75 MG/1
75 TABLET ORAL DAILY
COMMUNITY

## 2019-05-17 RX ORDER — SODIUM CHLORIDE 0.9 % (FLUSH) 0.9 %
5 SYRINGE (ML) INJECTION AS NEEDED
Status: DISCONTINUED | OUTPATIENT
Start: 2019-05-17 | End: 2019-05-18 | Stop reason: HOSPADM

## 2019-05-17 RX ORDER — DONEPEZIL HYDROCHLORIDE 5 MG/1
5 TABLET, FILM COATED ORAL
Status: DISCONTINUED | OUTPATIENT
Start: 2019-05-17 | End: 2019-05-18 | Stop reason: HOSPADM

## 2019-05-17 RX ORDER — NITROGLYCERIN 0.4 MG/1
0.4 TABLET SUBLINGUAL
Status: DISCONTINUED | OUTPATIENT
Start: 2019-05-17 | End: 2019-05-17

## 2019-05-17 RX ORDER — HEPARIN SODIUM 200 [USP'U]/100ML
3 INJECTION, SOLUTION INTRAVENOUS CONTINUOUS
Status: DISCONTINUED | OUTPATIENT
Start: 2019-05-17 | End: 2019-05-17 | Stop reason: HOSPADM

## 2019-05-17 RX ORDER — SODIUM CHLORIDE 9 MG/ML
75 INJECTION, SOLUTION INTRAVENOUS CONTINUOUS
Status: DISCONTINUED | OUTPATIENT
Start: 2019-05-17 | End: 2019-05-18 | Stop reason: HOSPADM

## 2019-05-17 RX ORDER — CLOPIDOGREL BISULFATE 75 MG/1
75 TABLET ORAL DAILY
Status: DISCONTINUED | OUTPATIENT
Start: 2019-05-18 | End: 2019-05-18 | Stop reason: HOSPADM

## 2019-05-17 RX ORDER — LIDOCAINE HYDROCHLORIDE 10 MG/ML
5-10 INJECTION INFILTRATION; PERINEURAL ONCE
Status: COMPLETED | OUTPATIENT
Start: 2019-05-17 | End: 2019-05-17

## 2019-05-17 RX ORDER — LEVOTHYROXINE SODIUM 88 UG/1
88 TABLET ORAL
Status: DISCONTINUED | OUTPATIENT
Start: 2019-05-18 | End: 2019-05-18 | Stop reason: HOSPADM

## 2019-05-17 RX ORDER — MIDAZOLAM HYDROCHLORIDE 1 MG/ML
.5-2 INJECTION, SOLUTION INTRAMUSCULAR; INTRAVENOUS
Status: DISCONTINUED | OUTPATIENT
Start: 2019-05-17 | End: 2019-05-17 | Stop reason: HOSPADM

## 2019-05-17 RX ORDER — GUAIFENESIN 100 MG/5ML
81 LIQUID (ML) ORAL DAILY
Status: DISCONTINUED | OUTPATIENT
Start: 2019-05-18 | End: 2019-05-18 | Stop reason: HOSPADM

## 2019-05-17 RX ADMIN — IOPAMIDOL 100 ML: 755 INJECTION, SOLUTION INTRAVENOUS at 13:10

## 2019-05-17 RX ADMIN — Medication 5 ML: at 22:00

## 2019-05-17 RX ADMIN — SODIUM CHLORIDE 75 ML/HR: 900 INJECTION, SOLUTION INTRAVENOUS at 11:33

## 2019-05-17 RX ADMIN — HEPARIN SODIUM 3 ML/HR: 5000 INJECTION, SOLUTION INTRAVENOUS; SUBCUTANEOUS at 12:44

## 2019-05-17 RX ADMIN — DONEPEZIL HYDROCHLORIDE 5 MG: 5 TABLET, FILM COATED ORAL at 22:00

## 2019-05-17 RX ADMIN — MIDAZOLAM HYDROCHLORIDE 1 MG: 1 INJECTION, SOLUTION INTRAMUSCULAR; INTRAVENOUS at 12:45

## 2019-05-17 RX ADMIN — HYDRALAZINE HYDROCHLORIDE 10 MG: 20 INJECTION INTRAMUSCULAR; INTRAVENOUS at 13:18

## 2019-05-17 RX ADMIN — BIVALIRUDIN 1.75 MG/KG/HR: 250 INJECTION, POWDER, LYOPHILIZED, FOR SOLUTION INTRAVENOUS at 12:50

## 2019-05-17 RX ADMIN — LIDOCAINE HYDROCHLORIDE 8 ML: 10 INJECTION, SOLUTION INFILTRATION; PERINEURAL at 12:44

## 2019-05-17 NOTE — PROGRESS NOTES
Patient's sister Kristina Stuart aware of patient and 's dementia. Family is putting measures in place to more closely observe their medical care.

## 2019-05-17 NOTE — PROCEDURES
Cardiac Catheterization Procedure Note    Patient ID:     Name: Deb Chambers   Medical Record Number: [id number]   YOB: 1945    Date of Procedure: 5/17/2019     Pre-procedure Diagnosis:  Typical Angina    Post-procedure Diagnosis: Coronary Artery Disease    Reason for Procedure: Worsening Angina    Blood loss less than 5 ml    Sedation. Pt received 1 mg versed and 0 mcg fentanyl for monitored conscious sedation from 1240to 115. Nurse agapito    Specimen: None    No complications    No assistants    Time out, Mallampati, and ASA performed    Procedure:  After informed consent, patient was prepped and draped in the usual sterile fashion. femoral approach was used. 120cc Visipaque contrast were utilized for the entire procedure.  no closure device used        FINDINGS    Left Ventricle: 65  LVEDP: 10    Left Main:ok    Left Anterior descending coronary artery: prox 90% ISR       Left Circumflex coronary artery: 40% prox disease        Right coronary artery: mild 20-30% prox rca            Graft anatomy: na    Intervention if done: PTCA to ISR of LAD 90 to 10% residual    Conclusions: one vessel PTCA    Recommentations: dapt    No complications      Signed By: Venessa Patel MD

## 2019-05-17 NOTE — PROGRESS NOTES
TRANSFER - OUT REPORT: 
 
R femoral [facility] with Dr Radha Hernández Versed 1 mg Hydralazine 10 mg Angiomax off 1315 No additional antiplatelet per MD 
Balloon in-stent restenosis of the LAD Arterial line attached to 6 fr sheath Sheath sutured in and covered with a tegaderm No bleeding or hematoma noted at site. Site soft Verbal report given to Luz Elena(name) on Ilean Jayuya  being transferred to CPRU(unit) for routine progression of care Report consisted of patients Situation, Background, Assessment and  
Recommendations(SBAR). Information from the following report(s) Procedure Summary was reviewed with the receiving nurse. Lines:  
Peripheral IV 05/17/19 Left Antecubital (Active) Peripheral IV 05/17/19 Right Antecubital (Active) Opportunity for questions and clarification was provided. Patient transported with: 
 Registered Nurse

## 2019-05-17 NOTE — PROGRESS NOTES
TRANSFER - IN REPORT: 
 
Verbal report received from Laura Meza [facility] on Con Asai being received from [facility] for routine progression of care Report consisted of patients Situation, Background, Assessment and Recommendations(SBAR). Information from the following report(s) SBAR, Kardex, Intake/Output, MAR, Recent Results and Med Rec Status was reviewed with the receiving nurse. Opportunity for questions and clarification was provided. Assessment completed upon patients arrival to unit and care assumed. Patient arrived to floor via stretcher. R groin site assessed at bedside. CDI. Patient educated to limit movement of R leg and bed rest for 6 hours. Patient and family verbalized understanding of above education. Skin and admission assessment completed upon arrival to room. BP cycling q15 minutes. Bed low and locked. Call light within reach. Side rails X2. Skin assessment completed. Sacrum red but blanchable. Heels are intact with no breakdown noted. R groin site s/p [facility], CDI.

## 2019-05-17 NOTE — PROGRESS NOTES
Suture and 6 Prydeinig sheath removed from right femoral artery and pressure held for 25 minutes. No bleeding or hematoma. Site redressed with sterile gauze covered with tegaderm.

## 2019-05-17 NOTE — ED TRIAGE NOTES
Pt ambulatory to triage without complications. Pt reports substernal cp x 1 day that worsens on exertion. Pt denies SOB or radiation of pain or n/v and diaphoresis. Pt has about 6-7 stents without MI, but blockages and this feels like when she had it before. Pt reports hx of hyperlipidemia, DM, CAD.

## 2019-05-17 NOTE — PROGRESS NOTES
Verbal bedside report received from Phil Loyola RN. Assumed care of patient. NS IV drip verified at bedside with outgoing RN. R groin site visualized. BP hourly.

## 2019-05-17 NOTE — PROGRESS NOTES
TRANSFER - OUT REPORT: 
 
Verbal report given to [address] [place] [address] ABHINAV Ornelas(name) on Gumaro Meredith  being transferred to telemetry(unit) for routine progression of care Report consisted of patients Situation, Background, Assessment and  
Recommendations(SBAR). Information from the following report(s) Kardex, Procedure Summary and Cardiac Rhythm NSR was reviewed with the receiving nurse. Lines:  
Peripheral IV 05/17/19 Right Antecubital (Active) Site Assessment Clean, dry, & intact 5/17/2019  4:30 PM  
Phlebitis Assessment 0 5/17/2019  4:30 PM  
Infiltration Assessment 0 5/17/2019  4:30 PM  
Dressing Status Clean, dry, & intact 5/17/2019  4:30 PM  
Dressing Type Tape;Transparent 5/17/2019  4:30 PM  
Hub Color/Line Status Patent; Infusing 5/17/2019  4:30 PM  
Alcohol Cap Used No 5/17/2019  4:30 PM  
  
 
Opportunity for questions and clarification was provided. Patient transported with: 
 Monitor Registered Nurse

## 2019-05-18 VITALS
TEMPERATURE: 97.8 F | OXYGEN SATURATION: 99 % | BODY MASS INDEX: 23.14 KG/M2 | RESPIRATION RATE: 18 BRPM | SYSTOLIC BLOOD PRESSURE: 113 MMHG | DIASTOLIC BLOOD PRESSURE: 69 MMHG | HEART RATE: 77 BPM | WEIGHT: 126.5 LBS

## 2019-05-18 LAB
ANION GAP SERPL CALC-SCNC: 8 MMOL/L (ref 7–16)
BUN SERPL-MCNC: 16 MG/DL (ref 8–23)
CALCIUM SERPL-MCNC: 8.3 MG/DL (ref 8.3–10.4)
CHLORIDE SERPL-SCNC: 107 MMOL/L (ref 98–107)
CO2 SERPL-SCNC: 26 MMOL/L (ref 21–32)
CREAT SERPL-MCNC: 0.64 MG/DL (ref 0.6–1)
ERYTHROCYTE [DISTWIDTH] IN BLOOD BY AUTOMATED COUNT: 13.1 % (ref 11.9–14.6)
GLUCOSE SERPL-MCNC: 165 MG/DL (ref 65–100)
HCT VFR BLD AUTO: 33.2 % (ref 35.8–46.3)
HGB BLD-MCNC: 11.2 G/DL (ref 11.7–15.4)
MCH RBC QN AUTO: 30.1 PG (ref 26.1–32.9)
MCHC RBC AUTO-ENTMCNC: 33.7 G/DL (ref 31.4–35)
MCV RBC AUTO: 89.2 FL (ref 79.6–97.8)
NRBC # BLD: 0 K/UL (ref 0–0.2)
PLATELET # BLD AUTO: 229 K/UL (ref 150–450)
PMV BLD AUTO: 10 FL (ref 9.4–12.3)
POTASSIUM SERPL-SCNC: 3.6 MMOL/L (ref 3.5–5.1)
RBC # BLD AUTO: 3.72 M/UL (ref 4.05–5.2)
SODIUM SERPL-SCNC: 141 MMOL/L (ref 136–145)
WBC # BLD AUTO: 6 K/UL (ref 4.3–11.1)

## 2019-05-18 PROCEDURE — 85027 COMPLETE CBC AUTOMATED: CPT

## 2019-05-18 PROCEDURE — 80048 BASIC METABOLIC PNL TOTAL CA: CPT

## 2019-05-18 PROCEDURE — 36415 COLL VENOUS BLD VENIPUNCTURE: CPT

## 2019-05-18 PROCEDURE — 74011250637 HC RX REV CODE- 250/637: Performed by: INTERNAL MEDICINE

## 2019-05-18 RX ORDER — PRAVASTATIN SODIUM 40 MG/1
40 TABLET ORAL
Qty: 30 TAB | Refills: 5 | Status: SHIPPED | OUTPATIENT
Start: 2019-05-18 | End: 2021-02-23 | Stop reason: SDUPTHER

## 2019-05-18 RX ADMIN — METOPROLOL SUCCINATE 25 MG: 25 TABLET, EXTENDED RELEASE ORAL at 08:06

## 2019-05-18 RX ADMIN — ASPIRIN 81 MG: 81 TABLET, CHEWABLE ORAL at 08:06

## 2019-05-18 RX ADMIN — Medication 5 ML: at 06:09

## 2019-05-18 RX ADMIN — CLOPIDOGREL BISULFATE 75 MG: 75 TABLET ORAL at 08:06

## 2019-05-18 RX ADMIN — MEMANTINE 10 MG: 5 TABLET ORAL at 08:06

## 2019-05-18 RX ADMIN — LEVOTHYROXINE SODIUM 88 MCG: 88 TABLET ORAL at 06:09

## 2019-05-18 RX ADMIN — ISOSORBIDE MONONITRATE 60 MG: 60 TABLET, EXTENDED RELEASE ORAL at 08:06

## 2019-05-18 NOTE — PROCEDURES
[facility]  CARDIAC CATH    Name:  Ami Liang  MR#:  [id number]  :  1945  ACCOUNT #:  [de-identified]  DATE OF SERVICE:  2019      PROCEDURES PERFORMED:  Left heart catheterization, selective coronary angiography, left ventriculogram with percutaneous intervention of an LAD. PREOPERATIVE DIAGNOSIS:  Coronary artery disease. POSTOPERATIVE DIAGNOSIS:  Percutaneous  balloon angioplasty of in-stent restenosis. SURGEON:  Handy Mack MD    ASSISTANT:  None. ESTIMATED BLOOD LOSS:  3cc. SPECIMENS REMOVED:  None. COMPLICATIONS:  None. IMPLANTS:  None. ANESTHESIA:  Sedation, 1 mg of Versed. INDICATION:  Worsening angina. Femoral arterial catheterization was performed due to poor Moy's test.  Neelima left 4, Neelima right 4, and straight pigtail were used for diagnostic images. Intervention was done with a Q-3.5 guide, Prowater wire, and a 2.0 x 20 NC balloon. FINDINGS:  Left ventriculogram done in [place] projection shows EF 65%, LVEDP of 10. No gradient on pullback. Left main arises normally, bifurcates into LAD and circumflex. Left main is moderate size with no disease. LAD arises off the left main, has previously been stented in its proximal portion. There are at least two layers of stent here. There is 80-90% in-stent restenosis. The LAD after the stented segment is a small caliber, but smooth artery with no significant disease. There is a diagonal off of the non-stented portion of the LAD that is patent. Circumflex artery in the AV groove supplies two OMs. The proximal circumflex has a 40-60% stenosis, followed shortly by the presence of a previously placed stent. This stent is widely patent and extends down by the OM1 and OM2. The two OMs have no significant disease. Right coronary artery arises in a normal fashion, course in the AV groove, and has moderate 20-30% proximal and mid plaque, distally it has been stented into the PDA. PDA stent is patent. Jailed posterolateral is patent. The patient is given therapeutic dose of Angiomax and balloon angioplasty with an NC 2.0 x 20 balloon is performed multiple times with approximately 10% residual stenosis. It was elected not to add a third layer stent in the LAD. CONCLUSIONS:  Coronary artery disease with in-stent restenosis of the LAD, status post PTCA with good results. Continue medical therapy.       Jo-Ann Guerrero MD      [id number]  D:  05/17/2019 13:32  T:  05/17/2019 13:43  JOB #:  [id number]

## 2019-05-18 NOTE — DISCHARGE SUMMARY
[facility] Discharge Summary     Patient ID:  Sy Regalado  [id number]  88 y.o.  1945    Admit date: 5/17/2019    Discharge date:  5/18/2019    Admitting Physician: Tessie Delgado MD     Discharge Physician: Dr. Murphy Houston    Admission Diagnoses: Chest pain [R07.9]  CAD (coronary artery disease) [I25.10]    Discharge Diagnoses:    Diagnosis    CAD (coronary artery disease)    Post PTCA    Coronary artery disease involving native coronary artery of native heart    Essential hypertension with goal blood pressure less than 130/85    Type 2 diabetes mellitus without complication, without long-term current use of insulin (HCC)    Mixed hyperlipidemia    S/P PTCA (percutaneous transluminal coronary angioplasty)       Cardiology Procedures this admission:  Left heart catheterization with PCI  Consults: None    Hospital Course: Patient was seen at the office of [facility] by Dr. Brittnee Nathan for complaints of chest pain and was subsequently scheduled for an AM Admission LH at [facility] on 5/17/19. Patient underwent cardiac catheterization by Dr. Marguerite Smith. Patient was found to have 90% in-stent restenosis of the LAD that was treated with a POBA with 10% residual stenosis. Patient tolerated the procedure well and was taken to the telemetry floor for recovery. The morning of discharge, patient was up feeling well without any complaints of chest pain or shortness of breath. Patient's right groin cath site was clean, dry and intact without hematoma or bruit. Patient's labs were WNL. Patient was seen and examined by Dr. Murphy Houston and determined stable and ready for discharge. Patient was instructed on the importance of medication compliance including taking aspirin and Plavix everyday without missing a dose. After receiving POBA, the patient will remain on dual anti-platelet therapy for 1 year. For maximized medical therapy for CAD, patient will continue  BB and statin.  Cab reassess need for ACE/ARB outpatient. The patient will follow up with [facility] -- Dr. Timmy Rey on 5/30/19 at 9:30am in the [place] office. Patient has been referred to cardiac rehab. DISPOSITION: The patient is being discharged home in stable condition on a low saturated fat, low cholesterol and low salt diet. The patient is instructed to advance activities as tolerated to the limit of fatigue or shortness of breath. The patient is instructed to avoid all heavy lifting, straining, stooping or squatting for 3-5 days. The patient is instructed to watch the cath site for bleeding/oozing; if seen, the patient is instructed to apply firm pressure with a clean cloth and call [facility] at [phone]. The patient is instructed to watch for signs of infection which include: increasing area of redness, fever/hot to touch or purulent drainage at the catheterization site. The patient is instructed not to soak in a bathtub for 7-10 days, but is cleared to shower. The patient is instructed to call the office or return to the ER for immediate evaluation for any shortness of breath or chest pain not relieved by NTG. Discharge Exam: Patient has been seen by Dr. Nohelia Atkins: see his progress note for exam details. Visit Vitals  /58 (BP 1 Location: Left arm, BP Patient Position: Supine)   Pulse 70   Temp 98.6 °F (37 °C)   Resp 15   Wt 57.4 kg (126 lb 8 oz)   SpO2 95%   Breastfeeding?  No   BMI 23.14 kg/m²       Recent Results (from the past 24 hour(s))   CBC W/O DIFF    Collection Time: 05/17/19 10:58 AM   Result Value Ref Range    WBC 4.9 4.3 - 11.1 K/uL    RBC 4.04 (L) 4.05 - 5.2 M/uL    HGB 12.2 11.7 - 15.4 g/dL    HCT 36.3 35.8 - 46.3 %    MCV 89.9 79.6 - 97.8 FL    MCH 30.2 26.1 - 32.9 PG    MCHC 33.6 31.4 - 35.0 g/dL    RDW 13.0 11.9 - 14.6 %    PLATELET 068 578 - 841 K/uL    MPV 10.2 9.4 - 12.3 FL    ABSOLUTE NRBC 0.00 0.0 - 0.2 K/uL   METABOLIC PANEL, BASIC    Collection Time: 05/17/19 10:58 AM Result Value Ref Range    Sodium 143 136 - 145 mmol/L    Potassium 3.7 3.5 - 5.1 mmol/L    Chloride 106 98 - 107 mmol/L    CO2 32 21 - 32 mmol/L    Anion gap 5 (L) 7 - 16 mmol/L    Glucose 123 (H) 65 - 100 mg/dL    BUN 14 8 - 23 MG/DL    Creatinine 0.72 0.6 - 1.0 MG/DL    GFR est AA >60 >60 ml/min/1.73m2    GFR est non-AA >60 >60 ml/min/1.73m2    Calcium 9.0 8.3 - 10.4 MG/DL   PROTHROMBIN TIME + INR    Collection Time: 05/17/19 10:58 AM   Result Value Ref Range    Prothrombin time 12.5 11.7 - 14.5 sec    INR 0.9     MAGNESIUM    Collection Time: 05/17/19 10:58 AM   Result Value Ref Range    Magnesium 2.0 1.8 - 2.4 mg/dL   POC TROPONIN-I    Collection Time: 05/17/19 11:02 AM   Result Value Ref Range    Troponin-I (POC) 0.01 (L) 0.02 - 0.05 ng/ml         Patient Instructions:     Current Discharge Medication List      START taking these medications    Details   pravastatin (PRAVACHOL) 40 mg tablet Take 1 Tab by mouth nightly. Qty: 30 Tab, Refills: 5         CONTINUE these medications which have NOT CHANGED    Details   clopidogrel (PLAVIX) 75 mg tab Take 75 mg by mouth daily. isosorbide mononitrate ER (IMDUR) 30 mg tablet Take 2 Tabs by mouth daily. Qty: 180 Tab, Refills: 3      aspirin 81 mg chewable tablet Take 1 Tab by mouth daily. Qty: 30 Tab, Refills: 11      levothyroxine (SYNTHROID) 88 mcg tablet TAKE 1 TABLET BY MOUTH EVERY DAY      estradiol (ESTRACE) 0.5 mg tablet TAKE 1 TABLET BY MOUTH EVERY DAY      memantine (NAMENDA) 10 mg tablet 10mg BID      metFORMIN ER (GLUCOPHAGE XR) 750 mg tablet Take 750 mg by mouth daily. metoprolol succinate (TOPROL-XL) 25 mg XL tablet TAKE 1 TABLET BY MOUTH EVERY DAY      nitroglycerin (NITROSTAT) 0.4 mg SL tablet 1 Tab by SubLINGual route every five (5) minutes as needed for Chest Pain. Up to 3 doses. Qty: 30 Tab, Refills: 5      donepezil (ARICEPT) 5 mg tablet Take  by mouth nightly.          Jay Macedo MD

## 2019-05-18 NOTE — PROGRESS NOTES
Discharge instructions reviewed with Patient and Spouse. Prescriptions given for Pravastatin and med info sheets provided for all new medications. Opportunity for questions provided. Patient and Spouse voiced understanding of all discharge instructions. IV and telemetry monitor removed by self.

## 2019-05-18 NOTE — DISCHARGE INSTRUCTIONS
Patient Education   Please resume metformin on 5/20/19      Percutaneous Coronary Intervention: What to Expect at [facility]    Percutaneous coronary intervention (PCI) is the name for procedures that are used to open a narrowed or blocked coronary artery. The two most common PCI procedures are coronary angioplasty and coronary stent placement. Your groin or arm may have a bruise and feel sore for a day or two after a percutaneous coronary intervention (PCI). You can do light activities around the house, but nothing strenuous for several days. This care sheet gives you a general idea about how long it will take for you to recover. But each person recovers at a different pace. Follow the steps below to get better as quickly as possible. How can you care for yourself at home? Activity    · If the doctor gave you a sedative:  ? For 24 hours, don't do anything that requires attention to detail. It takes time for the medicine's effects to completely wear off.  ? For your safety, do not drive or operate any machinery that could be dangerous. Wait until the medicine wears off and you can think clearly and react easily.     · Do not do strenuous exercise and do not lift, pull, or push anything heavy until your doctor says it is okay. This may be for a day or two. You can walk around the house and do light activity, such as cooking.     · If the catheter was placed in your groin, try not to walk up stairs for the first couple of days.     · If the catheter was placed in your arm near your wrist, do not bend your wrist deeply for the first couple of days. Be careful using your hand to get into and out of a chair or bed.     · Carry your stent identification card with you at all times.     · If your doctor recommends it, get more exercise. Walking is a good choice. Bit by bit, increase the amount you walk every day. Try for at least 30 minutes on most days of the week.    Diet    · Drink plenty of fluids to help your body flush out the dye. If you have kidney, heart, or liver disease and have to limit fluids, talk with your doctor before you increase the amount of fluids you drink.     · Keep eating a heart-healthy diet that has lots of fruits, vegetables, and whole grains. If you have not been eating this way, talk to your doctor. You also may want to talk to a dietitian. This expert can help you to learn about healthy foods and plan meals. Medicines    · Your doctor will tell you if and when you can restart your medicines. He or she will also give you instructions about taking any new medicines.     · If you take blood thinners, such as warfarin (Coumadin), clopidogrel (Plavix), or aspirin, be sure to talk to your doctor. He or she will tell you if and when to start taking those medicines again. Make sure that you understand exactly what your doctor wants you to do.     · Your doctor will prescribe blood-thinning medicines. You will likely take aspirin plus another antiplatelet, such as clopidogrel (Plavix). It is very important that you take these medicines exactly as directed. These medicines help keep the coronary artery open and reduce your risk of a heart attack.     · Call your doctor if you think you are having a problem with your medicine.    Care of the catheter site    · For 1 or 2 days, keep a bandage over the spot where the catheter was inserted. The bandage probably will fall off in this time.     · Put ice or a cold pack on the area for 10 to 20 minutes at a time to help with soreness or swelling. Put a thin cloth between the ice and your skin.     · You may shower 24 to 48 hours after the procedure, if your doctor okays it. Pat the incision dry.     · Do not soak the catheter site until it is healed. Don't take a bath for 1 week, or until your doctor tells you it is okay.     · If you are bleeding, lie down and press on the area for 15 minutes to try to make it stop.  If the bleeding does not stop, call your doctor or seek immediate medical care. Follow-up care is a key part of your treatment and safety. Be sure to make and go to all appointments, and call your doctor if you are having problems. It's also a good idea to know your test results and keep a list of the medicines you take. When should you call for help? Call 911 anytime you think you may need emergency care. For example, call if:    · You passed out (lost consciousness).     · You have severe trouble breathing.     · You have sudden chest pain and shortness of breath, or you cough up blood.     · You have symptoms of a heart attack, such as:  ? Chest pain or pressure. ? Sweating. ? Shortness of breath. ? Nausea or vomiting. ? Pain that spreads from the chest to the neck, jaw, or one or both shoulders or arms. ? Dizziness or lightheadedness. ? A fast or uneven pulse. After calling 911, chew 1 adult-strength aspirin. Wait for an ambulance. Do not try to drive yourself.     · You have been diagnosed with angina, and you have angina symptoms that do not go away with rest or are not getting better within 5 minutes after you take one dose of nitroglycerin.    Call your doctor now or seek immediate medical care if:    · You are bleeding from the area where the catheter was put in your artery.     · You have a fast-growing, painful lump at the catheter site.     · You have signs of infection, such as:  ? Increased pain, swelling, warmth, or redness. ? Red streaks leading from the catheter site. ? Pus draining from the catheter site. ? A fever.     · Your leg or arm looks blue or feels cold, numb, or tingly.    Watch closely for changes in your health, and be sure to contact your doctor if you have any problems. Where can you learn more? Go to http://delma-antonia.info/. Enter H856 in the search box to learn more about \"Percutaneous Coronary Intervention: What to Expect at Home. \"  Current as of: July 22, 2018  Content Version: 11.9  © [id number] Healthwise[employer]. Care instructions adapted under license by [employer] (which disclaims liability or warranty for this information). If you have questions about a medical condition or this instruction, always ask your healthcare professional. Dontae Garsia any warranty or liability for your use of this information. Patient Education        Statins: Care Instructions  Your Care Instructions    Statins are medicines that lower your cholesterol and your risk for a heart attack and stroke. Cholesterol is a type of fat in your blood. If you have too much cholesterol, it can build up in blood vessels. This raises your risk of heart disease, heart attack, and stroke. Statins lower cholesterol by blocking how much your body makes. This prevents cholesterol from building up in your blood vessels. This is called hardening of the arteries. It is the starting point for some heart and blood flow problems, such as heart disease. Statins may also reduce inflammation around the buildup (called plaque). This can lower the risk that the plaque will break apart and lead to a heart attack or stroke. A heart-healthy lifestyle is important for lowering your risk whether you take statins or not. This includes eating healthy foods, being active, staying at a healthy weight, and not smoking. You must take statins regularly for them to work well. If you stop, your cholesterol and your risk will go back up. Examples of statins include:  · Atorvastatin (Lipitor). · Lovastatin (Mevacor). · Pravastatin (Pravachol). · Simvastatin (Zocor). Statins interact with many medicines. So tell your doctor all of the other medicines that you take. These include prescription medicines, over-the-counter medicines, dietary supplements, and herbal products. Follow-up care is a key part of your treatment and safety.  Be sure to make and go to all appointments, and call your doctor if you are having problems. It's also a good idea to know your test results and keep a list of the medicines you take. How can you care for yourself at home? · Take statins exactly as your doctor tells you. High cholesterol has no symptoms. So it is easy to forget to take the pills. Try to make a system that reminds you to take them. · Do not take two or more medicines at the same time unless the doctor told you to. Statins can interact with other medicines. · Always tell your doctor if you think you are having a side effect. If side effects are a problem with one medicine, a different one may be used. · Keep making the lifestyle changes your doctor suggests. Eat heart-healthy foods, be active, don't smoke, and stay at a healthy weight. · Talk to your doctor about avoiding grapefruit juice if you take statins. Grapefruit juice can raise the level of this medicine in your blood. This could increase side effects. When should you call for help? Watch closely for changes in your health, and be sure to contact your doctor if:    · You think you are having problems with your medicine.     · You have aches or muscle pain. Where can you learn more? Go to http://delmaDigabitantonia.info/. Enter R358 in the search box to learn more about \"Statins: Care Instructions. \"  Current as of: July 22, 2018  Content Version: 11.9  © 1585-4561 [employer]. Care instructions adapted under license by [employer] (which disclaims liability or warranty for this information). If you have questions about a medical condition or this instruction, always ask your healthcare professional. [place] [address] any warranty or liability for your use of this information. Discharge instructions reviewed with Patient and Spouse. Prescriptions given for Pravachol and med info sheets provided for all new medications. Opportunity for questions provided.  Patient and Spouse voiced understanding of all discharge instructions.

## 2019-05-18 NOTE — PROGRESS NOTES
Problem: Falls - Risk of 
Goal: *Absence of Falls Description Document Mauricio Herbert Fall Risk and appropriate interventions in the flowsheet. Outcome: Progressing Towards Goal 
Note:  
Fall Risk Interventions: 
  
 
Mentation Interventions: Door open when patient unattended Medication Interventions: Evaluate medications/consider consulting pharmacy, Patient to call before getting OOB, Teach patient to arise slowly Elimination Interventions: Call light in reach Problem: Cath Lab Procedures: Post-Cath Day of Procedure (Initiate SCIP Measures for Post-Op Care) Goal: Activity/Safety Outcome: Progressing Towards Goal 
Goal: Medications Outcome: Progressing Towards Goal

## 2019-05-18 NOTE — PROGRESS NOTES
Verbal bedside report given to Dearlizz Ferrer, oncoming RN. Patient's situation, background, assessment and recommendations provided. Opportunity for questions provided. Oncoming RN assumed care of patient. NS at 75 ml/hr.

## 2019-05-18 NOTE — PROGRESS NOTES
Verbal bedside report received from [address]. Assumed care of patient. R groin site assessed at bedside with outgoing RN.

## 2019-05-28 ENCOUNTER — HOSPITAL ENCOUNTER (EMERGENCY)
Age: 74
Discharge: HOME | End: 2019-05-28
Attending: EMERGENCY MEDICINE
Payer: MEDICARE

## 2019-05-28 ENCOUNTER — APPOINTMENT (OUTPATIENT)
Dept: GENERAL RADIOLOGY | Age: 74
End: 2019-05-28
Attending: EMERGENCY MEDICINE
Payer: MEDICARE

## 2019-05-28 VITALS
HEART RATE: 66 BPM | SYSTOLIC BLOOD PRESSURE: 172 MMHG | DIASTOLIC BLOOD PRESSURE: 75 MMHG | OXYGEN SATURATION: 100 % | RESPIRATION RATE: 23 BRPM | TEMPERATURE: 98 F

## 2019-05-28 DIAGNOSIS — R07.89 ATYPICAL CHEST PAIN: Primary | ICD-10-CM

## 2019-05-28 LAB
ALBUMIN SERPL-MCNC: 3.6 G/DL (ref 3.2–4.6)
ALBUMIN/GLOB SERPL: 1.3 {RATIO} (ref 1.2–3.5)
ALP SERPL-CCNC: 57 U/L (ref 50–136)
ALT SERPL-CCNC: 15 U/L (ref 12–65)
ANION GAP SERPL CALC-SCNC: 6 MMOL/L (ref 7–16)
APTT PPP: 25 SEC (ref 24.7–39.8)
AST SERPL-CCNC: 16 U/L (ref 15–37)
ATRIAL RATE: 64 BPM
BASOPHILS # BLD: 0 K/UL (ref 0–0.2)
BASOPHILS NFR BLD: 1 % (ref 0–2)
BILIRUB SERPL-MCNC: 0.5 MG/DL (ref 0.2–1.1)
BUN SERPL-MCNC: 15 MG/DL (ref 8–23)
CALCIUM SERPL-MCNC: 8.8 MG/DL (ref 8.3–10.4)
CALCULATED P AXIS, ECG09: 54 DEGREES
CALCULATED R AXIS, ECG10: 47 DEGREES
CALCULATED T AXIS, ECG11: 45 DEGREES
CHLORIDE SERPL-SCNC: 108 MMOL/L (ref 98–107)
CO2 SERPL-SCNC: 29 MMOL/L (ref 21–32)
CREAT SERPL-MCNC: 0.72 MG/DL (ref 0.6–1)
DIAGNOSIS, 93000: NORMAL
DIFFERENTIAL METHOD BLD: ABNORMAL
EOSINOPHIL # BLD: 0.3 K/UL (ref 0–0.8)
EOSINOPHIL NFR BLD: 6 % (ref 0.5–7.8)
ERYTHROCYTE [DISTWIDTH] IN BLOOD BY AUTOMATED COUNT: 13.3 % (ref 11.9–14.6)
GLOBULIN SER CALC-MCNC: 2.8 G/DL (ref 2.3–3.5)
GLUCOSE SERPL-MCNC: 125 MG/DL (ref 65–100)
HCT VFR BLD AUTO: 34 % (ref 35.8–46.3)
HGB BLD-MCNC: 11.5 G/DL (ref 11.7–15.4)
IMM GRANULOCYTES # BLD AUTO: 0 K/UL (ref 0–0.5)
IMM GRANULOCYTES NFR BLD AUTO: 1 % (ref 0–5)
INR PPP: 0.9
LYMPHOCYTES # BLD: 1 K/UL (ref 0.5–4.6)
LYMPHOCYTES NFR BLD: 19 % (ref 13–44)
MCH RBC QN AUTO: 30.7 PG (ref 26.1–32.9)
MCHC RBC AUTO-ENTMCNC: 33.8 G/DL (ref 31.4–35)
MCV RBC AUTO: 90.9 FL (ref 79.6–97.8)
MONOCYTES # BLD: 0.4 K/UL (ref 0.1–1.3)
MONOCYTES NFR BLD: 7 % (ref 4–12)
NEUTS SEG # BLD: 3.4 K/UL (ref 1.7–8.2)
NEUTS SEG NFR BLD: 66 % (ref 43–78)
NRBC # BLD: 0 K/UL (ref 0–0.2)
P-R INTERVAL, ECG05: 158 MS
PLATELET # BLD AUTO: 231 K/UL (ref 150–450)
PMV BLD AUTO: 10.2 FL (ref 9.4–12.3)
POTASSIUM SERPL-SCNC: 3.7 MMOL/L (ref 3.5–5.1)
PROT SERPL-MCNC: 6.4 G/DL (ref 6.3–8.2)
PROTHROMBIN TIME: 12.5 SEC (ref 11.7–14.5)
Q-T INTERVAL, ECG07: 398 MS
QRS DURATION, ECG06: 76 MS
QTC CALCULATION (BEZET), ECG08: 410 MS
RBC # BLD AUTO: 3.74 M/UL (ref 4.05–5.2)
SODIUM SERPL-SCNC: 143 MMOL/L (ref 136–145)
TROPONIN I BLD-MCNC: 0 NG/ML (ref 0.02–0.05)
TROPONIN I BLD-MCNC: 0 NG/ML (ref 0.02–0.05)
VENTRICULAR RATE, ECG03: 64 BPM
WBC # BLD AUTO: 5.2 K/UL (ref 4.3–11.1)

## 2019-05-28 PROCEDURE — 85610 PROTHROMBIN TIME: CPT

## 2019-05-28 PROCEDURE — 85025 COMPLETE CBC W/AUTO DIFF WBC: CPT

## 2019-05-28 PROCEDURE — 99285 EMERGENCY DEPT VISIT HI MDM: CPT | Performed by: EMERGENCY MEDICINE

## 2019-05-28 PROCEDURE — 93005 ELECTROCARDIOGRAM TRACING: CPT | Performed by: EMERGENCY MEDICINE

## 2019-05-28 PROCEDURE — 84484 ASSAY OF TROPONIN QUANT: CPT

## 2019-05-28 PROCEDURE — 80053 COMPREHEN METABOLIC PANEL: CPT

## 2019-05-28 PROCEDURE — 85730 THROMBOPLASTIN TIME PARTIAL: CPT

## 2019-05-28 PROCEDURE — 71045 X-RAY EXAM CHEST 1 VIEW: CPT

## 2019-05-28 RX ORDER — NITROGLYCERIN 0.4 MG/1
0.4 TABLET SUBLINGUAL
Qty: 30 TAB | Refills: 5 | Status: SHIPPED | OUTPATIENT
Start: 2019-05-28 | End: 2020-05-04 | Stop reason: SDUPTHER

## 2019-05-28 NOTE — DISCHARGE INSTRUCTIONS
For any worsening chest pain, try nitroglycerin. If you required 3 nitroglycerin and the pain is still significant, recheck in the emergency department. Call cardiologist office for appointment to recheck. May recheck sooner for worse or worrisome symptoms. Important to take your Plavix and aspirin every day. Patient Education     Chest Pain (Angina): After Your Visit to the Emergency Room  Your Care Instructions  You have chest pain called angina because at times your heart does not get enough blood. This is caused by coronary artery disease (CAD). It causes major blood vessels to the heart to become narrow or blocked. You did not have a heart attack, but CAD does increase your risk for a heart attack. Even though you have been released from the emergency room, you still need to watch for any problems. The doctor carefully checked you. But sometimes problems can develop later. A visit to the emergency room is only one step in your treatment. Even if you feel better, you still need to do what your doctor recommends, such as going to all suggested follow-up appointments and taking medicines exactly as directed. This will help you recover and help prevent future problems. How can you care for yourself at home? · If your doctor has given you nitroglycerin or other nitrate medicine, keep it with you at all times. If you have chest pain, sit down and rest, and take your nitroglycerin or other nitrates as directed. If your chest pain does not get better after 5 minutes, call 911. · Take your medicine exactly as prescribed. Call your doctor if you think you are having a problem with your medicine. When should you call for help? Call 911 if:  · You passed out (lost consciousness). · You have chest pain that does not go away with rest or is not getting better within 5 minutes after you take a dose of nitroglycerin. · You have symptoms of a heart attack.  These may include:  ¨ Chest pain or pressure, or a strange feeling in the chest.  ¨ Sweating. ¨ Shortness of breath. ¨ Nausea or vomiting. ¨ Pain, pressure, or a strange feeling in the back, neck, jaw, or upper belly or in one or both shoulders or arms. ¨ Lightheadedness or sudden weakness. ¨ A fast or irregular pulse. After you call 911, the  may tell you to chew 1 adult-strength or 2 to 4 low-dose aspirin. Wait for an ambulance. Do not try to drive yourself. · You have other symptoms that you think are a medical emergency. Return to the emergency room now if:  · You are having chest pain more often than usual, even if it goes away when you rest or take nitroglycerin. · You feel dizzy or lightheaded, or you feel like you may faint. Where can you learn more? Go to GSOUND.be  Enter Z755 in the search box to learn more about \"Chest Pain (Angina): After Your Visit to the Emergency Room. \"   © 5684-0755 Healthwise, Incorporated. Care instructions adapted under license by [facility] (which disclaims liability or warranty for this information). This care instruction is for use with your licensed healthcare professional. If you have questions about a medical condition or this instruction, always ask your healthcare professional. [address] any warranty or liability for your use of this information. Content Version: 9.4.93268;  Last Revised: February 23, 2012

## 2019-05-28 NOTE — ED NOTES
I have reviewed discharge instructions with the patient. The patient verbalized understanding. Patient left ED via Discharge Method: ambulatory to Home with self    Opportunity for questions and clarification provided. Patient given 1 scripts. To continue your aftercare when you leave the hospital, you may receive an automated call from our care team to check in on how you are doing. This is a free service and part of our promise to provide the best care and service to meet your aftercare needs.  If you have questions, or wish to unsubscribe from this service please call [phone]. Thank you for Choosing our [facility] [employer] [facility] Emergency Department.

## 2019-05-28 NOTE — ED PROVIDER NOTES
71-year-old female sent over from the cardiologist's office. She denies any pain now but thinks she may have had chest pain earlier.  states the patient complains of chest tightness yesterday and took some nitroglycerin. Patient thinks he may have been taking her aspirin but  is not sure she is taking any aspirin or Plavix. She had angioplasty of a  stenosed stent one week ago. No nausea vomiting diaphoresis or fever or cough. No shortness of breath. Received Brilinta in the office prior to being sent over here. The history is provided by the patient, the spouse and a caregiver. Chest Pain (Angina)    This is a new problem. The current episode started yesterday. The problem has been resolved. The problem occurs constantly. The pain is present in the substernal region. The pain is mild. The quality of the pain is described as pressure-like. The pain does not radiate. Associated symptoms include dizziness. Pertinent negatives include no abdominal pain, no back pain, no cough, no diaphoresis, no fever, no headaches, no lower extremity edema, no nausea, no numbness, no palpitations, no shortness of breath and no vomiting. Risk factors include hypertension, diabetes mellitus and cardiac disease. Her past medical history is significant for HTN. Her past medical history does not include DVT or PE. Procedural history includes cardiac catheterization, cardiac stents and angioplasty. Past Medical History:   Diagnosis Date    CAD (coronary artery disease)     Diabetes ([address]     GERD (gastroesophageal reflux disease)     Psychiatric disorder     Thyroid disease        Past Surgical History:   Procedure Laterality Date    CARDIAC SURG PROCEDURE UNLIST      stent    HX HYSTERECTOMY           History reviewed. No pertinent family history.     Social History     Socioeconomic History    Marital status:      Spouse name: Not on file    Number of children: Not on file    Years of education: Not on file    Highest education level: Not on file   Occupational History    Not on file   Social Needs    Financial resource strain: Not on file    Food insecurity:     Worry: Not on file     Inability: Not on file    Transportation needs:     Medical: Not on file     Non-medical: Not on file   Tobacco Use    Smoking status: Never Smoker    Smokeless tobacco: Never Used   Substance and Sexual Activity    Alcohol use: Yes     Comment: occasional    Drug use: No    Sexual activity: Not on file   Lifestyle    Physical activity:     Days per week: Not on file     Minutes per session: Not on file    Stress: Not on file   Relationships    Social connections:     Talks on phone: Not on file     Gets together: Not on file     Attends Sabianism service: Not on file     Active member of club or organization: Not on file     Attends meetings of clubs or organizations: Not on file     Relationship status: Not on file    Intimate partner violence:     Fear of current or ex partner: Not on file     Emotionally abused: Not on file     Physically abused: Not on file     Forced sexual activity: Not on file   Other Topics Concern    Not on file   Social History Narrative    Not on file         ALLERGIES: Patient has no known allergies. Review of Systems   Constitutional: Negative for chills, diaphoresis and fever. Respiratory: Negative for cough and shortness of breath. Cardiovascular: Positive for chest pain. Negative for palpitations. Gastrointestinal: Negative for abdominal pain, diarrhea, nausea and vomiting. Genitourinary: Negative for dysuria and flank pain. Musculoskeletal: Negative for back pain and neck pain. Skin: Negative for color change and rash. Neurological: Positive for dizziness. Negative for syncope, numbness and headaches. All other systems reviewed and are negative. There were no vitals filed for this visit.          Physical Exam   Constitutional: She is oriented to person, place, and time. She appears well-developed and well-nourished. No distress. HENT:   Head: Normocephalic and atraumatic. Right Ear: External ear normal.   Left Ear: External ear normal.   Mouth/Throat: Oropharynx is clear and moist. No oropharyngeal exudate. Eyes: Pupils are equal, round, and reactive to light. Conjunctivae and EOM are normal.   Neck: Normal range of motion. Neck supple. Cardiovascular: Normal rate, regular rhythm and intact distal pulses. No murmur heard. Pulmonary/Chest: Breath sounds normal. No respiratory distress. Abdominal: Soft. Bowel sounds are normal. She exhibits no mass. There is no tenderness. There is no rebound and no guarding. No hernia. Neurological: She is alert and oriented to person, place, and time. Gait normal.   Nl speech   Skin: Skin is warm and dry. Psychiatric: She has a normal mood and affect. Her speech is normal.   Nursing note and vitals reviewed. MDM  Number of Diagnoses or Management Options  Diagnosis management comments: Concern for unstable angina. May be brought on by questionable compliance with antiplatelet medications. Check EKG and chest x-ray. Doubt pulmonary embolism. Check for pneumonia or congestive heart failure.   Discussed with cardiology       Amount and/or Complexity of Data Reviewed  Clinical lab tests: ordered and reviewed  Tests in the radiology section of CPT®: ordered and reviewed  Tests in the medicine section of CPT®: ordered and reviewed  Review and summarize past medical records: yes (Old records revealed angioplasty of LAD lesion on May 17.)  Discuss the patient with other providers: yes  Independent visualization of images, tracings, or specimens: yes    Risk of Complications, Morbidity, and/or Mortality  Presenting problems: moderate  Diagnostic procedures: low  Management options: moderate    Patient Progress  Patient progress: stable         Procedures      Results Include:    Recent Results (from the past 24 hour(s))   EKG, 12 LEAD, INITIAL    Collection Time: 05/28/19 10:16 AM   Result Value Ref Range    Ventricular Rate 64 BPM    Atrial Rate 64 BPM    P-R Interval 158 ms    QRS Duration 76 ms    Q-T Interval 398 ms    QTC Calculation (Bezet) 410 ms    Calculated P Axis 54 degrees    Calculated R Axis 47 degrees    Calculated T Axis 45 degrees    Diagnosis       !! AGE AND GENDER SPECIFIC ECG ANALYSIS !! Sinus rhythm with marked sinus arrhythmia  Otherwise normal ECG  When compared with ECG of 03-OCT-2018 07:51,  No significant change was found  Confirmed by AUGUSTIN CORNELIUS (), Melisa Gurrola ([id number]) on 5/28/2019 10:50:21 AM     POC TROPONIN-I    Collection Time: 05/28/19 10:33 AM   Result Value Ref Range    Troponin-I (POC) 0 (L) 0.02 - 0.05 ng/ml     Xr Chest Port    Result Date: 5/28/2019  CHEST X-RAY, one view. HISTORY:  Chest pain. TECHNIQUE:  AP upright portable view. COMPARISON: None. FINDINGS:  The lungs are clear. The heart size is normal. The costophrenic angles are sharp. The pulmonary vasculature is unremarkable. There are aortic calcifications. Included portion of the upper abdomen is unremarkable. Negative for acute change. 11:06 AM  Patient denies pain. Discussed with cardiology. They are familiar with the case and patient and state that if 2 negative troponins are obtained, patient can be discharged home with assurance that she would take Plavix and aspirin.

## 2019-05-28 NOTE — ED TRIAGE NOTES
Patient was brought from cardiology office ( was the patient) by [facility]. Patient started having some chest pressure and had an angioplasty 2 weeks ago. Patient admits to partial non-compliance with medications. Pain started with a 4/10. When EMS arrived, pain was 2/10. Pain is currently 0/10. Patient received 1 SL nitro tablet from cardiologist's office and 324 mg aspirin. 12-lead was normal since with occasional PVCs. EMS reports normal sinus EKG. , /80, HR 60, O2 100%. EMS has given no meds and placed in 18 in left AC.

## 2019-06-19 PROBLEM — E11.9 TYPE 2 DIABETES MELLITUS WITHOUT COMPLICATION (HCC): Status: ACTIVE | Noted: 2019-06-19

## 2019-06-19 PROBLEM — I25.10 CORONARY ARTERY DISEASE: Status: ACTIVE | Noted: 2019-06-19

## 2019-08-07 PROBLEM — Z98.61 HISTORY OF PTCA: Status: ACTIVE | Noted: 2019-08-07

## 2021-02-17 PROBLEM — I25.10 CORONARY ARTERY DISEASE INVOLVING NATIVE CORONARY ARTERY OF NATIVE HEART: Status: RESOLVED | Noted: 2018-09-25 | Resolved: 2021-02-17

## 2021-02-17 PROBLEM — R07.89 CHEST DISCOMFORT: Status: RESOLVED | Noted: 2018-09-25 | Resolved: 2021-02-17

## 2021-02-17 PROBLEM — I25.10 CAD (CORONARY ARTERY DISEASE): Status: RESOLVED | Noted: 2019-05-17 | Resolved: 2021-02-17

## 2021-02-18 ENCOUNTER — HOSPITAL ENCOUNTER (OUTPATIENT)
Dept: LAB | Age: 76
Discharge: HOME | End: 2021-02-18
Payer: MEDICARE

## 2021-02-18 DIAGNOSIS — E11.9 TYPE 2 DIABETES MELLITUS WITHOUT COMPLICATION, UNSPECIFIED WHETHER LONG TERM INSULIN USE (HCC): ICD-10-CM

## 2021-02-18 DIAGNOSIS — E78.2 MIXED HYPERLIPIDEMIA: ICD-10-CM

## 2021-02-18 LAB
ALBUMIN SERPL-MCNC: 3.6 G/DL (ref 3.2–4.6)
ALBUMIN/GLOB SERPL: 1 {RATIO} (ref 1.2–3.5)
ALP SERPL-CCNC: 75 U/L (ref 50–136)
ALT SERPL-CCNC: 13 U/L (ref 12–65)
ANION GAP SERPL CALC-SCNC: 4 MMOL/L (ref 7–16)
AST SERPL-CCNC: 11 U/L (ref 15–37)
BASOPHILS # BLD: 0 K/UL (ref 0–0.2)
BASOPHILS NFR BLD: 0 % (ref 0–2)
BILIRUB SERPL-MCNC: 0.2 MG/DL (ref 0.2–1.1)
BUN SERPL-MCNC: 11 MG/DL (ref 8–23)
CALCIUM SERPL-MCNC: 9.3 MG/DL (ref 8.3–10.4)
CHLORIDE SERPL-SCNC: 104 MMOL/L (ref 98–107)
CHOLEST SERPL-MCNC: 266 MG/DL
CO2 SERPL-SCNC: 31 MMOL/L (ref 21–32)
CREAT SERPL-MCNC: 0.84 MG/DL (ref 0.6–1)
DIFFERENTIAL METHOD BLD: ABNORMAL
EOSINOPHIL # BLD: 0.2 K/UL (ref 0–0.8)
EOSINOPHIL NFR BLD: 4 % (ref 0.5–7.8)
ERYTHROCYTE [DISTWIDTH] IN BLOOD BY AUTOMATED COUNT: 13.2 % (ref 11.9–14.6)
EST. AVERAGE GLUCOSE BLD GHB EST-MCNC: 123 MG/DL
GLOBULIN SER CALC-MCNC: 3.5 G/DL (ref 2.3–3.5)
GLUCOSE SERPL-MCNC: 188 MG/DL (ref 65–100)
HBA1C MFR BLD: 5.9 % (ref 4.2–6.3)
HCT VFR BLD AUTO: 36.6 % (ref 35.8–46.3)
HDLC SERPL-MCNC: 67 MG/DL (ref 40–60)
HDLC SERPL: 4 {RATIO}
HGB BLD-MCNC: 11.6 G/DL (ref 11.7–15.4)
IMM GRANULOCYTES # BLD AUTO: 0 K/UL (ref 0–0.5)
IMM GRANULOCYTES NFR BLD AUTO: 0 % (ref 0–5)
LDLC SERPL CALC-MCNC: 153 MG/DL
LIPID PROFILE,FLP: ABNORMAL
LYMPHOCYTES # BLD: 1.4 K/UL (ref 0.5–4.6)
LYMPHOCYTES NFR BLD: 21 % (ref 13–44)
MCH RBC QN AUTO: 29.7 PG (ref 26.1–32.9)
MCHC RBC AUTO-ENTMCNC: 31.7 G/DL (ref 31.4–35)
MCV RBC AUTO: 93.8 FL (ref 79.6–97.8)
MONOCYTES # BLD: 0.5 K/UL (ref 0.1–1.3)
MONOCYTES NFR BLD: 7 % (ref 4–12)
NEUTS SEG # BLD: 4.6 K/UL (ref 1.7–8.2)
NEUTS SEG NFR BLD: 68 % (ref 43–78)
NRBC # BLD: 0 K/UL (ref 0–0.2)
PLATELET # BLD AUTO: 311 K/UL (ref 150–450)
PMV BLD AUTO: 10.2 FL (ref 9.4–12.3)
POTASSIUM SERPL-SCNC: 4.6 MMOL/L (ref 3.5–5.1)
PROT SERPL-MCNC: 7.1 G/DL (ref 6.3–8.2)
RBC # BLD AUTO: 3.9 M/UL (ref 4.05–5.2)
SODIUM SERPL-SCNC: 139 MMOL/L (ref 136–145)
TRIGL SERPL-MCNC: 230 MG/DL (ref 35–150)
VLDLC SERPL CALC-MCNC: 46 MG/DL (ref 6–23)
WBC # BLD AUTO: 6.8 K/UL (ref 4.3–11.1)

## 2021-02-18 PROCEDURE — 80053 COMPREHEN METABOLIC PANEL: CPT

## 2021-02-18 PROCEDURE — 80061 LIPID PANEL: CPT

## 2021-02-18 PROCEDURE — 36415 COLL VENOUS BLD VENIPUNCTURE: CPT

## 2021-02-18 PROCEDURE — 83036 HEMOGLOBIN GLYCOSYLATED A1C: CPT

## 2021-02-18 PROCEDURE — 85025 COMPLETE CBC W/AUTO DIFF WBC: CPT
